# Patient Record
Sex: FEMALE | Race: WHITE | Employment: UNEMPLOYED | URBAN - METROPOLITAN AREA
[De-identification: names, ages, dates, MRNs, and addresses within clinical notes are randomized per-mention and may not be internally consistent; named-entity substitution may affect disease eponyms.]

---

## 2017-01-04 ENCOUNTER — APPOINTMENT (OUTPATIENT)
Dept: CT IMAGING | Facility: CLINIC | Age: 57
End: 2017-01-04
Attending: FAMILY MEDICINE
Payer: MEDICARE

## 2017-01-04 ENCOUNTER — APPOINTMENT (OUTPATIENT)
Dept: CARDIOLOGY | Facility: CLINIC | Age: 57
DRG: 065 | End: 2017-01-04
Attending: INTERNAL MEDICINE
Payer: MEDICARE

## 2017-01-04 ENCOUNTER — APPOINTMENT (OUTPATIENT)
Dept: OCCUPATIONAL THERAPY | Facility: CLINIC | Age: 57
DRG: 065 | End: 2017-01-04
Attending: INTERNAL MEDICINE
Payer: MEDICARE

## 2017-01-04 ENCOUNTER — HOSPITAL ENCOUNTER (EMERGENCY)
Facility: CLINIC | Age: 57
Discharge: ANOTHER HOSPITAL | End: 2017-01-04
Attending: FAMILY MEDICINE | Admitting: FAMILY MEDICINE
Payer: MEDICARE

## 2017-01-04 ENCOUNTER — APPOINTMENT (OUTPATIENT)
Dept: MRI IMAGING | Facility: CLINIC | Age: 57
DRG: 065 | End: 2017-01-04
Attending: INTERNAL MEDICINE
Payer: MEDICARE

## 2017-01-04 ENCOUNTER — TELEPHONE (OUTPATIENT)
Dept: NURSING | Facility: CLINIC | Age: 57
End: 2017-01-04

## 2017-01-04 ENCOUNTER — HOSPITAL ENCOUNTER (INPATIENT)
Facility: CLINIC | Age: 57
LOS: 1 days | Discharge: HOME | DRG: 065 | End: 2017-01-05
Attending: INTERNAL MEDICINE | Admitting: INTERNAL MEDICINE
Payer: MEDICARE

## 2017-01-04 VITALS
DIASTOLIC BLOOD PRESSURE: 84 MMHG | HEIGHT: 65 IN | TEMPERATURE: 97.2 F | SYSTOLIC BLOOD PRESSURE: 145 MMHG | RESPIRATION RATE: 18 BRPM | BODY MASS INDEX: 39.99 KG/M2 | OXYGEN SATURATION: 97 % | WEIGHT: 240 LBS

## 2017-01-04 DIAGNOSIS — I63.40 CEREBRAL INFARCTION DUE TO EMBOLISM OF CEREBRAL ARTERY (H): ICD-10-CM

## 2017-01-04 DIAGNOSIS — I63.9 CEREBROVASCULAR ACCIDENT (CVA), UNSPECIFIED MECHANISM (H): Primary | ICD-10-CM

## 2017-01-04 LAB
ABO + RH BLD: NORMAL
ABO + RH BLD: NORMAL
ALBUMIN SERPL-MCNC: 3.3 G/DL (ref 3.4–5)
ALBUMIN UR-MCNC: NEGATIVE MG/DL
ALP SERPL-CCNC: 146 U/L (ref 40–150)
ALT SERPL W P-5'-P-CCNC: 19 U/L (ref 0–50)
ANION GAP SERPL CALCULATED.3IONS-SCNC: 9 MMOL/L (ref 3–14)
APPEARANCE UR: CLEAR
APTT PPP: 30 SEC (ref 22–37)
APTT PPP: 30 SEC (ref 22–37)
AST SERPL W P-5'-P-CCNC: 12 U/L (ref 0–45)
BACTERIA #/AREA URNS HPF: ABNORMAL /HPF
BASOPHILS # BLD AUTO: 0 10E9/L (ref 0–0.2)
BASOPHILS NFR BLD AUTO: 0.1 %
BILIRUB DIRECT SERPL-MCNC: <0.1 MG/DL (ref 0–0.2)
BILIRUB SERPL-MCNC: 0.5 MG/DL (ref 0.2–1.3)
BILIRUB UR QL STRIP: NEGATIVE
BLD GP AB SCN SERPL QL: NORMAL
BLOOD BANK CMNT PATIENT-IMP: NORMAL
BUN SERPL-MCNC: 10 MG/DL (ref 7–30)
CALCIUM SERPL-MCNC: 7.5 MG/DL (ref 8.5–10.1)
CHLORIDE SERPL-SCNC: 110 MMOL/L (ref 94–109)
CHOLEST SERPL-MCNC: 227 MG/DL
CO2 SERPL-SCNC: 22 MMOL/L (ref 20–32)
COLOR UR AUTO: ABNORMAL
CREAT SERPL-MCNC: 0.92 MG/DL (ref 0.52–1.04)
DIFFERENTIAL METHOD BLD: ABNORMAL
EOSINOPHIL # BLD AUTO: 0.4 10E9/L (ref 0–0.7)
EOSINOPHIL NFR BLD AUTO: 4.3 %
ERYTHROCYTE [DISTWIDTH] IN BLOOD BY AUTOMATED COUNT: 14.4 % (ref 10–15)
GFR SERPL CREATININE-BSD FRML MDRD: 63 ML/MIN/1.7M2
GLUCOSE BLDC GLUCOMTR-MCNC: 105 MG/DL (ref 70–99)
GLUCOSE SERPL-MCNC: 99 MG/DL (ref 70–99)
GLUCOSE UR STRIP-MCNC: NEGATIVE MG/DL
HBA1C MFR BLD: 5.1 % (ref 4.3–6)
HCT VFR BLD AUTO: 35.4 % (ref 35–47)
HDLC SERPL-MCNC: 38 MG/DL
HGB BLD-MCNC: 11.2 G/DL (ref 11.7–15.7)
HGB UR QL STRIP: NEGATIVE
IMM GRANULOCYTES # BLD: 0 10E9/L (ref 0–0.4)
IMM GRANULOCYTES NFR BLD: 0.1 %
INR PPP: 0.95 (ref 0.86–1.14)
INR PPP: 1.14 (ref 0.86–1.14)
KETONES UR STRIP-MCNC: NEGATIVE MG/DL
LDLC SERPL CALC-MCNC: 156 MG/DL
LEUKOCYTE ESTERASE UR QL STRIP: NEGATIVE
LYMPHOCYTES # BLD AUTO: 2.3 10E9/L (ref 0.8–5.3)
LYMPHOCYTES NFR BLD AUTO: 26.7 %
MCH RBC QN AUTO: 31 PG (ref 26.5–33)
MCHC RBC AUTO-ENTMCNC: 31.6 G/DL (ref 31.5–36.5)
MCV RBC AUTO: 98 FL (ref 78–100)
MONOCYTES # BLD AUTO: 0.7 10E9/L (ref 0–1.3)
MONOCYTES NFR BLD AUTO: 7.5 %
MRSA DNA SPEC QL NAA+PROBE: NORMAL
NEUTROPHILS # BLD AUTO: 5.4 10E9/L (ref 1.6–8.3)
NEUTROPHILS NFR BLD AUTO: 61.3 %
NITRATE UR QL: NEGATIVE
NONHDLC SERPL-MCNC: 189 MG/DL
PH UR STRIP: 5.5 PH (ref 5–7)
PLATELET # BLD AUTO: 306 10E9/L (ref 150–450)
POTASSIUM SERPL-SCNC: 3.4 MMOL/L (ref 3.4–5.3)
PROT SERPL-MCNC: 7 G/DL (ref 6.8–8.8)
RADIOLOGIST FLAGS: ABNORMAL
RBC # BLD AUTO: 3.61 10E12/L (ref 3.8–5.2)
RBC #/AREA URNS AUTO: <1 /HPF (ref 0–2)
SODIUM SERPL-SCNC: 141 MMOL/L (ref 133–144)
SP GR UR STRIP: 1.04 (ref 1–1.03)
SPECIMEN EXP DATE BLD: NORMAL
SPECIMEN SOURCE: NORMAL
SQUAMOUS #/AREA URNS AUTO: 1 /HPF (ref 0–1)
TRANS CELLS #/AREA URNS HPF: <1 /HPF (ref 0–1)
TRIGL SERPL-MCNC: 166 MG/DL
TROPONIN I SERPL-MCNC: NORMAL UG/L (ref 0–0.04)
TROPONIN I SERPL-MCNC: NORMAL UG/L (ref 0–0.04)
URN SPEC COLLECT METH UR: ABNORMAL
UROBILINOGEN UR STRIP-MCNC: NORMAL MG/DL (ref 0–2)
WBC # BLD AUTO: 8.8 10E9/L (ref 4–11)
WBC #/AREA URNS AUTO: 1 /HPF (ref 0–2)

## 2017-01-04 PROCEDURE — 93306 TTE W/DOPPLER COMPLETE: CPT

## 2017-01-04 PROCEDURE — 83036 HEMOGLOBIN GLYCOSYLATED A1C: CPT | Performed by: STUDENT IN AN ORGANIZED HEALTH CARE EDUCATION/TRAINING PROGRAM

## 2017-01-04 PROCEDURE — 86901 BLOOD TYPING SEROLOGIC RH(D): CPT | Performed by: STUDENT IN AN ORGANIZED HEALTH CARE EDUCATION/TRAINING PROGRAM

## 2017-01-04 PROCEDURE — 87640 STAPH A DNA AMP PROBE: CPT | Performed by: INTERNAL MEDICINE

## 2017-01-04 PROCEDURE — 70450 CT HEAD/BRAIN W/O DYE: CPT

## 2017-01-04 PROCEDURE — 84484 ASSAY OF TROPONIN QUANT: CPT | Performed by: STUDENT IN AN ORGANIZED HEALTH CARE EDUCATION/TRAINING PROGRAM

## 2017-01-04 PROCEDURE — 84484 ASSAY OF TROPONIN QUANT: CPT | Performed by: FAMILY MEDICINE

## 2017-01-04 PROCEDURE — 81001 URINALYSIS AUTO W/SCOPE: CPT | Performed by: STUDENT IN AN ORGANIZED HEALTH CARE EDUCATION/TRAINING PROGRAM

## 2017-01-04 PROCEDURE — 70498 CT ANGIOGRAPHY NECK: CPT

## 2017-01-04 PROCEDURE — 25000132 ZZH RX MED GY IP 250 OP 250 PS 637: Mod: GY | Performed by: STUDENT IN AN ORGANIZED HEALTH CARE EDUCATION/TRAINING PROGRAM

## 2017-01-04 PROCEDURE — 87641 MR-STAPH DNA AMP PROBE: CPT | Performed by: INTERNAL MEDICINE

## 2017-01-04 PROCEDURE — 97535 SELF CARE MNGMENT TRAINING: CPT | Mod: GO

## 2017-01-04 PROCEDURE — 80061 LIPID PANEL: CPT | Performed by: STUDENT IN AN ORGANIZED HEALTH CARE EDUCATION/TRAINING PROGRAM

## 2017-01-04 PROCEDURE — 80048 BASIC METABOLIC PNL TOTAL CA: CPT | Performed by: FAMILY MEDICINE

## 2017-01-04 PROCEDURE — 85610 PROTHROMBIN TIME: CPT | Performed by: STUDENT IN AN ORGANIZED HEALTH CARE EDUCATION/TRAINING PROGRAM

## 2017-01-04 PROCEDURE — 25000128 H RX IP 250 OP 636: Performed by: STUDENT IN AN ORGANIZED HEALTH CARE EDUCATION/TRAINING PROGRAM

## 2017-01-04 PROCEDURE — 25500045 ZZH RX 255: Performed by: INTERNAL MEDICINE

## 2017-01-04 PROCEDURE — 20000004 ZZH R&B ICU UMMC

## 2017-01-04 PROCEDURE — 99285 EMERGENCY DEPT VISIT HI MDM: CPT | Mod: 25

## 2017-01-04 PROCEDURE — 80076 HEPATIC FUNCTION PANEL: CPT | Performed by: STUDENT IN AN ORGANIZED HEALTH CARE EDUCATION/TRAINING PROGRAM

## 2017-01-04 PROCEDURE — 96365 THER/PROPH/DIAG IV INF INIT: CPT | Mod: 59

## 2017-01-04 PROCEDURE — 36415 COLL VENOUS BLD VENIPUNCTURE: CPT | Performed by: STUDENT IN AN ORGANIZED HEALTH CARE EDUCATION/TRAINING PROGRAM

## 2017-01-04 PROCEDURE — 99406 BEHAV CHNG SMOKING 3-10 MIN: CPT

## 2017-01-04 PROCEDURE — 93005 ELECTROCARDIOGRAM TRACING: CPT

## 2017-01-04 PROCEDURE — 40000141 ZZH STATISTIC PERIPHERAL IV START W/O US GUIDANCE

## 2017-01-04 PROCEDURE — 85610 PROTHROMBIN TIME: CPT | Performed by: FAMILY MEDICINE

## 2017-01-04 PROCEDURE — 96376 TX/PRO/DX INJ SAME DRUG ADON: CPT

## 2017-01-04 PROCEDURE — 25000125 ZZHC RX 250: Performed by: FAMILY MEDICINE

## 2017-01-04 PROCEDURE — 36415 COLL VENOUS BLD VENIPUNCTURE: CPT | Performed by: FAMILY MEDICINE

## 2017-01-04 PROCEDURE — 40000264 ECHO COMPLETE BUBBLE

## 2017-01-04 PROCEDURE — 25000128 H RX IP 250 OP 636: Performed by: FAMILY MEDICINE

## 2017-01-04 PROCEDURE — 85730 THROMBOPLASTIN TIME PARTIAL: CPT | Performed by: STUDENT IN AN ORGANIZED HEALTH CARE EDUCATION/TRAINING PROGRAM

## 2017-01-04 PROCEDURE — 97165 OT EVAL LOW COMPLEX 30 MIN: CPT | Mod: GO

## 2017-01-04 PROCEDURE — 85730 THROMBOPLASTIN TIME PARTIAL: CPT | Performed by: FAMILY MEDICINE

## 2017-01-04 PROCEDURE — 93010 ELECTROCARDIOGRAM REPORT: CPT | Performed by: FAMILY MEDICINE

## 2017-01-04 PROCEDURE — 97110 THERAPEUTIC EXERCISES: CPT | Mod: GO

## 2017-01-04 PROCEDURE — 25500064 ZZH RX 255 OP 636: Performed by: FAMILY MEDICINE

## 2017-01-04 PROCEDURE — 99291 CRITICAL CARE FIRST HOUR: CPT | Mod: 25 | Performed by: FAMILY MEDICINE

## 2017-01-04 PROCEDURE — A9270 NON-COVERED ITEM OR SERVICE: HCPCS | Mod: GY | Performed by: STUDENT IN AN ORGANIZED HEALTH CARE EDUCATION/TRAINING PROGRAM

## 2017-01-04 PROCEDURE — 40000133 ZZH STATISTIC OT WARD VISIT

## 2017-01-04 PROCEDURE — A9585 GADOBUTROL INJECTION: HCPCS | Performed by: INTERNAL MEDICINE

## 2017-01-04 PROCEDURE — 93306 TTE W/DOPPLER COMPLETE: CPT | Mod: 26 | Performed by: INTERNAL MEDICINE

## 2017-01-04 PROCEDURE — 86900 BLOOD TYPING SEROLOGIC ABO: CPT | Performed by: STUDENT IN AN ORGANIZED HEALTH CARE EDUCATION/TRAINING PROGRAM

## 2017-01-04 PROCEDURE — 86850 RBC ANTIBODY SCREEN: CPT | Performed by: STUDENT IN AN ORGANIZED HEALTH CARE EDUCATION/TRAINING PROGRAM

## 2017-01-04 PROCEDURE — 85025 COMPLETE CBC W/AUTO DIFF WBC: CPT | Performed by: FAMILY MEDICINE

## 2017-01-04 PROCEDURE — 00000146 ZZHCL STATISTIC GLUCOSE BY METER IP

## 2017-01-04 PROCEDURE — 70553 MRI BRAIN STEM W/O & W/DYE: CPT

## 2017-01-04 PROCEDURE — 96375 TX/PRO/DX INJ NEW DRUG ADDON: CPT

## 2017-01-04 RX ORDER — FENTANYL CITRATE 50 UG/ML
50-100 INJECTION, SOLUTION INTRAMUSCULAR; INTRAVENOUS
Status: DISCONTINUED | OUTPATIENT
Start: 2017-01-04 | End: 2017-01-04 | Stop reason: HOSPADM

## 2017-01-04 RX ORDER — POTASSIUM CHLORIDE 7.45 MG/ML
10 INJECTION INTRAVENOUS
Status: DISCONTINUED | OUTPATIENT
Start: 2017-01-04 | End: 2017-01-05 | Stop reason: HOSPADM

## 2017-01-04 RX ORDER — SODIUM CHLORIDE 9 MG/ML
INJECTION, SOLUTION INTRAVENOUS CONTINUOUS
Status: DISCONTINUED | OUTPATIENT
Start: 2017-01-04 | End: 2017-01-05

## 2017-01-04 RX ORDER — PANTOPRAZOLE SODIUM 40 MG/1
40 TABLET, DELAYED RELEASE ORAL DAILY
Status: DISCONTINUED | OUTPATIENT
Start: 2017-01-04 | End: 2017-01-04 | Stop reason: ALTCHOICE

## 2017-01-04 RX ORDER — IOPAMIDOL 755 MG/ML
100 INJECTION, SOLUTION INTRAVASCULAR ONCE
Status: COMPLETED | OUTPATIENT
Start: 2017-01-04 | End: 2017-01-04

## 2017-01-04 RX ORDER — NICOTINE 21 MG/24HR
1 PATCH, TRANSDERMAL 24 HOURS TRANSDERMAL DAILY
Status: DISCONTINUED | OUTPATIENT
Start: 2017-01-04 | End: 2017-01-04

## 2017-01-04 RX ORDER — PANTOPRAZOLE SODIUM 40 MG/1
40 TABLET, DELAYED RELEASE ORAL 2 TIMES DAILY
Status: DISCONTINUED | OUTPATIENT
Start: 2017-01-04 | End: 2017-01-05 | Stop reason: HOSPADM

## 2017-01-04 RX ORDER — AMOXICILLIN 250 MG
1-2 CAPSULE ORAL 2 TIMES DAILY
Status: DISCONTINUED | OUTPATIENT
Start: 2017-01-04 | End: 2017-01-05 | Stop reason: HOSPADM

## 2017-01-04 RX ORDER — NICOTINE 21 MG/24HR
1 PATCH, TRANSDERMAL 24 HOURS TRANSDERMAL DAILY
Status: DISCONTINUED | OUTPATIENT
Start: 2017-01-04 | End: 2017-01-04 | Stop reason: DRUGHIGH

## 2017-01-04 RX ORDER — POTASSIUM CHLORIDE 1.5 G/1.58G
20-40 POWDER, FOR SOLUTION ORAL
Status: DISCONTINUED | OUTPATIENT
Start: 2017-01-04 | End: 2017-01-05 | Stop reason: HOSPADM

## 2017-01-04 RX ORDER — ONDANSETRON 4 MG/1
4 TABLET, ORALLY DISINTEGRATING ORAL EVERY 6 HOURS PRN
Status: DISCONTINUED | OUTPATIENT
Start: 2017-01-04 | End: 2017-01-05 | Stop reason: HOSPADM

## 2017-01-04 RX ORDER — GADOBUTROL 604.72 MG/ML
10 INJECTION INTRAVENOUS ONCE
Status: COMPLETED | OUTPATIENT
Start: 2017-01-04 | End: 2017-01-04

## 2017-01-04 RX ORDER — POLYETHYLENE GLYCOL 3350 17 G/17G
17 POWDER, FOR SOLUTION ORAL DAILY
Status: DISCONTINUED | OUTPATIENT
Start: 2017-01-04 | End: 2017-01-05 | Stop reason: HOSPADM

## 2017-01-04 RX ORDER — DIAZEPAM 5 MG
5 TABLET ORAL ONCE
Status: COMPLETED | OUTPATIENT
Start: 2017-01-04 | End: 2017-01-04

## 2017-01-04 RX ORDER — TOPIRAMATE 50 MG/1
200 TABLET, FILM COATED ORAL EVERY MORNING
Status: DISCONTINUED | OUTPATIENT
Start: 2017-01-04 | End: 2017-01-05 | Stop reason: HOSPADM

## 2017-01-04 RX ORDER — TRAZODONE HYDROCHLORIDE 50 MG/1
50 TABLET, FILM COATED ORAL AT BEDTIME
COMMUNITY
Start: 2016-12-27

## 2017-01-04 RX ORDER — LIDOCAINE 40 MG/G
CREAM TOPICAL
Status: DISCONTINUED | OUTPATIENT
Start: 2017-01-04 | End: 2017-01-05 | Stop reason: HOSPADM

## 2017-01-04 RX ORDER — POTASSIUM CHLORIDE 750 MG/1
20-40 TABLET, EXTENDED RELEASE ORAL
Status: DISCONTINUED | OUTPATIENT
Start: 2017-01-04 | End: 2017-01-05 | Stop reason: HOSPADM

## 2017-01-04 RX ORDER — NITROGLYCERIN 0.4 MG/1
TABLET SUBLINGUAL
COMMUNITY
Start: 2016-08-05

## 2017-01-04 RX ORDER — LABETALOL HYDROCHLORIDE 5 MG/ML
10 INJECTION, SOLUTION INTRAVENOUS EVERY 10 MIN PRN
Status: DISCONTINUED | OUTPATIENT
Start: 2017-01-04 | End: 2017-01-05 | Stop reason: HOSPADM

## 2017-01-04 RX ORDER — BISACODYL 10 MG
10 SUPPOSITORY, RECTAL RECTAL DAILY PRN
Status: DISCONTINUED | OUTPATIENT
Start: 2017-01-04 | End: 2017-01-05 | Stop reason: HOSPADM

## 2017-01-04 RX ORDER — ESCITALOPRAM OXALATE 10 MG/1
20 TABLET ORAL DAILY
COMMUNITY

## 2017-01-04 RX ORDER — HYDROCODONE BITARTRATE AND ACETAMINOPHEN 5; 325 MG/1; MG/1
1 TABLET ORAL EVERY 6 HOURS PRN
Status: DISCONTINUED | OUTPATIENT
Start: 2017-01-04 | End: 2017-01-05 | Stop reason: HOSPADM

## 2017-01-04 RX ORDER — MAGNESIUM SULFATE HEPTAHYDRATE 40 MG/ML
4 INJECTION, SOLUTION INTRAVENOUS EVERY 4 HOURS PRN
Status: DISCONTINUED | OUTPATIENT
Start: 2017-01-04 | End: 2017-01-05 | Stop reason: HOSPADM

## 2017-01-04 RX ORDER — DIAZEPAM 5 MG
5 TABLET ORAL EVERY 6 HOURS PRN
COMMUNITY
Start: 2016-12-15

## 2017-01-04 RX ORDER — ACETAMINOPHEN 325 MG/1
650 TABLET ORAL EVERY 4 HOURS PRN
Status: DISCONTINUED | OUTPATIENT
Start: 2017-01-04 | End: 2017-01-05 | Stop reason: HOSPADM

## 2017-01-04 RX ORDER — NALOXONE HYDROCHLORIDE 0.4 MG/ML
.1-.4 INJECTION, SOLUTION INTRAMUSCULAR; INTRAVENOUS; SUBCUTANEOUS
Status: DISCONTINUED | OUTPATIENT
Start: 2017-01-04 | End: 2017-01-05 | Stop reason: HOSPADM

## 2017-01-04 RX ORDER — POTASSIUM CHLORIDE 29.8 MG/ML
20 INJECTION INTRAVENOUS
Status: DISCONTINUED | OUTPATIENT
Start: 2017-01-04 | End: 2017-01-05 | Stop reason: HOSPADM

## 2017-01-04 RX ORDER — SUCRALFATE ORAL 1 G/10ML
1 SUSPENSION ORAL 4 TIMES DAILY
Status: DISCONTINUED | OUTPATIENT
Start: 2017-01-04 | End: 2017-01-05 | Stop reason: HOSPADM

## 2017-01-04 RX ORDER — ATORVASTATIN CALCIUM 40 MG/1
40 TABLET, FILM COATED ORAL DAILY
Status: DISCONTINUED | OUTPATIENT
Start: 2017-01-04 | End: 2017-01-05 | Stop reason: HOSPADM

## 2017-01-04 RX ORDER — DIAZEPAM 5 MG
5 TABLET ORAL EVERY 6 HOURS PRN
Status: DISCONTINUED | OUTPATIENT
Start: 2017-01-04 | End: 2017-01-05 | Stop reason: HOSPADM

## 2017-01-04 RX ORDER — ESCITALOPRAM OXALATE 20 MG/1
20 TABLET ORAL DAILY
Status: DISCONTINUED | OUTPATIENT
Start: 2017-01-04 | End: 2017-01-05 | Stop reason: HOSPADM

## 2017-01-04 RX ORDER — ONDANSETRON 2 MG/ML
4 INJECTION INTRAMUSCULAR; INTRAVENOUS EVERY 6 HOURS PRN
Status: DISCONTINUED | OUTPATIENT
Start: 2017-01-04 | End: 2017-01-05 | Stop reason: HOSPADM

## 2017-01-04 RX ADMIN — NICOTINE 1 PATCH: 7 PATCH, EXTENDED RELEASE TRANSDERMAL at 09:59

## 2017-01-04 RX ADMIN — SODIUM CHLORIDE 70 ML: 9 INJECTION, SOLUTION INTRAVENOUS at 04:23

## 2017-01-04 RX ADMIN — SODIUM CHLORIDE: 0.9 INJECTION, SOLUTION INTRAVENOUS at 10:00

## 2017-01-04 RX ADMIN — HYDROCODONE BITARTRATE AND ACETAMINOPHEN 1 TABLET: 5; 325 TABLET ORAL at 13:48

## 2017-01-04 RX ADMIN — SODIUM CHLORIDE 100 ML: 900 INJECTION INTRAVENOUS at 05:23

## 2017-01-04 RX ADMIN — ATORVASTATIN CALCIUM 40 MG: 40 TABLET, FILM COATED ORAL at 09:59

## 2017-01-04 RX ADMIN — DIAZEPAM 5 MG: 5 TABLET ORAL at 11:08

## 2017-01-04 RX ADMIN — TOPIRAMATE 200 MG: 50 TABLET ORAL at 10:01

## 2017-01-04 RX ADMIN — HYDROCODONE BITARTRATE AND ACETAMINOPHEN 1 TABLET: 5; 325 TABLET ORAL at 20:35

## 2017-01-04 RX ADMIN — GADOBUTROL 10 ML: 604.72 INJECTION INTRAVENOUS at 12:24

## 2017-01-04 RX ADMIN — ALTEPLASE 81.02 MG: KIT at 04:23

## 2017-01-04 RX ADMIN — NICOTINE 1 PATCH: 7 PATCH, EXTENDED RELEASE TRANSDERMAL at 13:49

## 2017-01-04 RX ADMIN — SUCRALFATE 1 G: 1 SUSPENSION ORAL at 10:01

## 2017-01-04 RX ADMIN — SENNOSIDES AND DOCUSATE SODIUM 2 TABLET: 8.6; 5 TABLET ORAL at 09:58

## 2017-01-04 RX ADMIN — PANTOPRAZOLE SODIUM 40 MG: 40 TABLET, DELAYED RELEASE ORAL at 10:17

## 2017-01-04 RX ADMIN — IOPAMIDOL 80 ML: 755 INJECTION, SOLUTION INTRAVENOUS at 04:23

## 2017-01-04 RX ADMIN — SENNOSIDES AND DOCUSATE SODIUM 2 TABLET: 8.6; 5 TABLET ORAL at 20:35

## 2017-01-04 RX ADMIN — PANTOPRAZOLE SODIUM 40 MG: 40 TABLET, DELAYED RELEASE ORAL at 20:35

## 2017-01-04 RX ADMIN — FENTANYL CITRATE 50 MCG: 50 INJECTION, SOLUTION INTRAMUSCULAR; INTRAVENOUS at 04:41

## 2017-01-04 RX ADMIN — SODIUM CHLORIDE 500 ML: 9 INJECTION, SOLUTION INTRAVENOUS at 04:27

## 2017-01-04 RX ADMIN — ESCITALOPRAM OXALATE 20 MG: 20 TABLET ORAL at 09:58

## 2017-01-04 RX ADMIN — TOPIRAMATE 300 MG: 50 TABLET ORAL at 20:36

## 2017-01-04 RX ADMIN — ALTEPLASE 9 MG: KIT at 04:21

## 2017-01-04 ASSESSMENT — VISUAL ACUITY
OU: NORMAL ACUITY

## 2017-01-04 ASSESSMENT — ACTIVITIES OF DAILY LIVING (ADL): IADL_COMMENTS: ASSIST FROM FAMILY

## 2017-01-04 ASSESSMENT — ENCOUNTER SYMPTOMS
SHORTNESS OF BREATH: 0
CONFUSION: 0
ABDOMINAL PAIN: 0
FEVER: 0
NAUSEA: 0
WEAKNESS: 1
SPEECH DIFFICULTY: 0
LIGHT-HEADEDNESS: 0
DIFFICULTY URINATING: 0
FACIAL ASYMMETRY: 0
HEADACHES: 1
DIZZINESS: 0
COUGH: 0
NUMBNESS: 0

## 2017-01-04 NOTE — PLAN OF CARE
Problem: Goal Outcome Summary  Goal: Goal Outcome Summary  SLP: Orders received for bedside swallow evaluation.  Swallow screened by MD team resulting in the recommendation of a regular texture diet and thin liquids.  RN reports no concerns re: swallow function.  Upon discussion with MD team, swallow evaluation to be deferred.  ST to sign off; please re-consult as medically appropriate.

## 2017-01-04 NOTE — PLAN OF CARE
Problem: Goal Outcome Summary  Goal: Goal Outcome Summary  OT 4A: OT eval and treatment completed. Tolerated range of motion supine in bed and provided with adaptive equipment for increased independence with self feeding. Limited participation due to bedrest.     Anticipate home with assist for heavy household chores with OP OT/PT to increase strength and activity tolerance for ADLs and R hand deficits, but will await until able to assess out of bed activity

## 2017-01-04 NOTE — PLAN OF CARE
Problem: Goal Outcome Summary  Goal: Goal Outcome Summary  Outcome: No Change      Patient arrived via ambulance from [facility] after receiving TPA for suspected stroke.  Symptoms only involved inability of right hand to move, sensation still present.  TPA received at 0421.  Upon arrival, patient able to move right thumb.  No other deficits noted except baseline RLE weakness and touch sensitivity to right ankle due to neuropathies.  Patient arrived with PIV x 1.  Patient passed swallow evaluation by MD.  See flowsheet for specifics.  Continue to monitor.

## 2017-01-04 NOTE — TELEPHONE ENCOUNTER
"Call Type: Triage Call    Presenting Problem: Verena states that about an hour ago, her right  hand \"quit working\". She is able to lift her right arm without  difficulty. She is not able to  with her right hand, or even  move one finger. Denies headache, trouble speaking, one sided facial  droop, lightheaded or dizziness. No known injury.  Triage Note:  Guideline Title: Neurological Deficits  Recommended Disposition: Activate   Original Inclination: Wanted to speak with a nurse  Override Disposition:  Intended Action: Go to Hospital / ED  Physician Contacted: No  New numbness, weakness or paralysis involving face, arm or leg, especially on same  side of body, loss of balance or coordination, confusion or trouble speaking  occurring now or within last 8 hours ?  YES  New or worsening signs and symptoms that may indicate shock ? NO  Seizure hasn't stopped (continuous) OR does not fully awaken between seizures ? NO  Unconscious now or within last 6 hours ? NO  Sudden, severe disabling head pain OR caller spontaneously verbalizes \"worst  headache of my life\" ? NO  New paralysis (unable to move) or weakness (not due to pain) involving both sides  of body below a specific level ? NO  First seizure or probable first seizure AND remains not fully alert, confused,  disoriented, or combative 5 minutes or more after seizure has stopped. ? NO  Physician Instructions:  Care Advice:  "

## 2017-01-04 NOTE — H&P
[facility], [place]      Neurology Stroke Admission    Patient Name: Verena Velasquez  : 1960 MRN#: [id number]    STROKE DATA     Stroke Code:  Stroke code not indicated. Patient presented from outside hospital after receiving tPA.  Time patient seen:  2017 0720   Last known normal (pt's baseline):  1/3/17 0130     TPA treatment:  tPA given at outside hospital at 0421 17    National Institutes [employer] Stroke Scale (at presentation)  NIHSS done at:  0720 17 on arrival to [facility]      Score   Level of consciousness: (0)   Alert, keenly responsive   LOC questions: (0)   Answers both questions correctly   LOC commands: (0)   Performs both tasks correctly   Best gaze: (0)   Normal   Visual: (0)   No visual loss   Facial palsy: (0)   Normal symmetrical movements   Motor arm (left): (0)   No drift   Motor arm (right): (0)   No drift   Motor leg (left): (0)   No drift   Motor leg (right): (0)   No drift   Limb ataxia: (1)   Present in one limb   Sensory: (0)   Normal- no sensory loss   Best language: (0)   Normal- no aphasia   Dysarthria: (0)   Normal   Extinction and inattention: (0)   No abnormality       NIHSS Total Score: 1        Dysphagia Screen  Time of screenin2017 0730  Screening results: Passed screening, no dysarthria - Regular Diet with thin liquids     ASSESSMENT & PLAN BY PROBLEM     Work-up for Ischemic Stroke (post TPA)      Acute Ischemic Stroke: presented from outside hospital with 2 hours of right hand weakness and dysmetria s/p tPA.  - Risks and benefits of tPA discussed with outside hospital provider prior to administration  - Admit to Neuro ICU, under Neurocritical Care Team  - Close monitoring and neurochecks for any evidence of hemorrhagic transformation or allergic reaction  - Labetalol PRN to maintain BP < 180/105  - Euthermia: prn acetaminophen   - Euglycemia: goal   - Head of bed elevated  - Hold aspirin and pharmacologic DVT  prophylaxis for at least 24 hrs post-tPA  - Started atorvastatin 40mg, lipid panel pending  - Will not repeat HCT 24 hrs post-tPA as MRI will be done today.  - MRI Stroke Protocol (Received CTA at outside hospital, no need for MRA)  - TTE with Bubble Study  - Telemetry, EKG  - Bedside Glucose Monitoring  - A1c, Lipid Panel, Troponin x 2 (troponin checked 1 time at outside hospital)  - PT/OT/SLP  - PM&R  - Stroke Education  - Smoking Cessation education; nicotine patch 7mg and nicotine gum prn  - Depression Screen  - Apnea Screen     Patient was admitted via transfer from [facility] ED.     The patient will be admitted to the Neuro Critical Care/Stroke team..     Neuro: Acute stroke: plan as above.  History of Complex Regional Pain Syndrome: Will continue PTA  topiramate.      Cardiac: Patient has history HTN per chart review. No antihypertensives PTA.  Monitoring BP closely post tPA as above.    Respiratory: History of COPD per chart review.  No history of PFTs . Prescribed home inhalers but she stated she has not used them in over a year. No acute concerns at this time.    GI: History of GERD.  Will continue PTA nexium and sucralfate.    ID: No acute concerns at this time.    Endocrine: No history of Diabetes.  HbA1c 5.1 on admission. Glucose checks and euglycemic as above.     Renal: No history of kidney disease. No acute concerns at this time.     Hematology: No acute concerns at this time.     Psych: History of anxiety. Will continue PTA lexapro and prn valium.    Fluids/Electrolytes/Nutrition  0.9% sodium chloride @ 100 mL/hr  Avoid hypotonic fluids.    Nutrition:   Active Diet Order  Regular Diet Adult    Prophylaxis            SCDs. Avoid heparin and lovenox in the first 24 hours post TPA    GI: continue PTA nexium    Code Status  Full Code    HPI  Verena Velasquez is a 56 year old right handed female with a history of HTN, chronic regional pain syndrome, tobacco use, and anxiety who is presenting from an  outside hospital for right hand weakness.  She was watching TV at home and noted difficultly grasping the remote control with her right hand at 0130 on 1/4/16, which is the last known normal.  She then presented to an outside hospital at 0330 on 1/4/16 and was noted to have an NIHSS of 2 for right hand weakness and RUE dysmetria.  CT head showed evidence of chronic small vessel disease and CTA was unremarkable.  TPA was administered at 0421 on 1/4/16. She was then transferred to [facility]. In the ambulance ride to [facility] she noted she was able to move her right thumb but still had difficulty moving her right wrist and fingers. On arrival her NIHSS was noted to be 1 for RUE dysmetria. She denied any changes in vision, changes in speech, difficulty swallowing, numbness, tingling, and weakness with the exception of her right hand. She denied any prior history of stroke.     Pertinent Past Medical/Surgical History  Past Medical History   Diagnosis Date     Hypertension      Gastro-oesophageal reflux disease      COPD (chronic obstructive pulmonary disease) (H)      Chronic pain        Past Surgical History   Procedure Laterality Date     Orthopedic surgery       Gyn surgery         Medications: I have reviewed this patient's current medications.    Allergies: All allergies reviewed and addressed.    Family History: This patient has no significant family history.    Social History: I have reviewed this patient's social history.    Tobacco use: 1ppd for 30 years, now 3 cigarettes per day for the last 3 months    ROS:  The 10 point Review of Systems is negative other than noted in the HPI or here.     PHYSICAL EXAMINATION  Vital Signs:  B/P: 136/88,  T: 97.6,  P: Data Unavailable,  R: 17    General:  Lying in bed and in NAD.    HEENT:  AT/NC. MMM  Cardio:  RRR, no murmurs appreciated.   Pulmonary:  Clear to auscultation bilaterally, no wheezes or crackles noted. No increased work of breathing  Abdomen:  Bowel sounds  present  Extremities:  No pedal edema.  Skin:  No rashes or lesions    Neurologic  Mental Status:  Alert, oriented to person, place, and time.  Interacting appropriately and able to follow commands.  No evidence of aphasia.  Cranial Nerves:  PERRL, visual fields intact, EOMI with normal smooth pursuit, facial sensation intact and symmetric, facial movements symmetric, hearing not formally tested but intact to conversation, palate elevation symmetric and uvula midline, no dysarthria, shoulder shrug strong bilaterally, tongue protrusion midline  Motor:  No abnormal movements, normal tone throughout, normal muscle bulk, no pronator drift.  Strength 5/5 throughout LUE, LLE, and RLE.  Strength 5/5 with right shoulder abduction, arm flexion/extension. Unable to move right wrist against gravity with wrist flexion/extension.  Unable to adduct/abduct left fingers. Normal ROM with right thumb.    Reflexes:  2+ and symmetric throughout, toes mute  Sensory:  intact/symmetric to light touch throughout upper and lower extremities. No evidence of neglect.  Coordination:  FNF intact in LUE and HS intact without dysmetria. FNF in RUE with mild dysmetria.  Station/Gait:  Deferred; bedrest for 1st 24 hours post TPA    Labs  Labs and Imaging reviewed and used in developing the plan; pertinent results included.     Lab Results   Component Value Date    GLC 99 01/04/2017       The patient was discussed with the staff Neurologist, Dr. Álvarez.    Meek Bernard   Neurology PGY1  Pager: [phone]

## 2017-01-04 NOTE — IP AVS SNAPSHOT
MRN:[id number]                      After Visit Summary   1/4/2017    Verena Velasquez    MRN: [id number]           Thank you!     Thank you for choosing [facility] for your care. Our goal is always to provide you with excellent care. Hearing back from our patients is one way we can continue to improve our services. Please take a few minutes to complete the written survey that you may receive in the mail after you visit with us. Thank you!        Patient Information     Date Of Birth          1960        About your hospital stay     You were admitted on:  January 4, 2017 You last received care in the:  [address] [facility] [place]    You were discharged on:  January 5, 2017        Reason for your hospital stay       Right hand weakness found to have a stroke.                  Who to Call     For medical emergencies, please call 911.  For non-urgent questions about your medical care, please call your primary care provider or clinic, [phone]          Attending Provider     Provider    Cali Álvarez MD       Primary Care Provider Office Phone # Fax #    Erlin B Jermeyi [phone]       [facility] [address] [facility] [address]         When to contact your care team       Please go to the emergency room if you are having any vision changes, changes in speech, facial droop, difficulty swallowing, weakness, or numbness as these can be symptoms of stroke.                  After Care Instructions     Activity       Your activity upon discharge: activity as tolerated            Diet       Follow this diet upon discharge: Orders Placed This Encounter  Regular Diet Adult            Discharge Instructions       1) Follow up with PCP within 7 days for hospital follow up  2) Follow up in stroke clinic. Please Clinic as noted above to schedule appointment.  3) Zio Patch cardiac monitor for two weeks.  Results will be discussed at stroke follow up.  4) Start aspirin 325mg,  atorvastatin 40mg, and nicotine gum as needed  5) Follow up with Occupational Therapy  5)                  Follow-up Appointments     Adult [facility] Follow-up and recommended labs and tests       Follow up with primary care provider, Erlin Webster, within 7 days for hospital follow- up.      Please contact Stroke Clinic- Neurology Clinic at [phone], pick option #1,  if you have not been contacted to schedule a stroke follow up appointment in 5 days of discharge. At stroke clinic results of Zio Patch will be discussed.    If you have other stroke related questions, please call [phone], pick option #3, and ask to speak to Edwar Urias RN Stroke/Endovascular Care Coordinator.    If you have any urgent stroke related questions after hours, please contact the hospital  at [phone] and ask to be connected to a stroke provider.     Appointments on [place] and/or [facility] (with [facility] or [facility] provider or service). Call [phone] if you haven't heard regarding these appointments within 7 days of discharge.                  Additional Services     Occupational Therapy Referral       This therapy referral will be filtered to a centralized scheduling office at [facility] and the patient will receive a call to schedule an appointment at a [facility] location most convenient for them.     [facility] provides Occupational Therapy evaluation and treatment and many specialty services across the [facility] system.  If requesting a specialty program, please choose from the list below.    If you have not heard from the scheduling office within 2 business days, please call [phone] for all locations, with the exception of [place], please call [phone].     Treatment: Evaluation & Treatment  Special Instructions/Modalities: Right hand weakness after stroke    Please be aware that coverage of these services is subject to the terms and limitations of your  health insurance plan.  Call member services at your health plan with any benefit or coverage questions.      Note to Provider:  If you are referring outside of [place] for the therapy appointment, please list the name of the location in the  special instructions  above, print the referral and give to the patient to schedule the appointment.                  Stroke Education     Please review the items below to help with stroke prevention.  Additional prevention suggestions are in the Understanding Stroke Handbook that you received.    Stroke risk factor changes that can help with stroke prevention:      Blood Pressure: Maintain your blood pressure within the goal the doctor sets with you.    If you are diabetic, work with your doctor to control your blood sugar and monitor your hemoglobin A1C (HbA1c).     Your doctor may recommend a statin medication to help lower your cholesterol level.    If you have an irregular heartbeat, speak to your doctor about possible treatments.    Maintain a healthy weight.    Eat a healthy diet. We suggest a Mediterranean or heart-healthy diet, but discuss what's best for you with your doctor.    Limit alcohol consumption.    If you smoke - QUIT.  We encourage you to get support. Start by talking with your doctor. Another option is to call the Quit Plan Program at [phone].    Stroke  Warning Signs:     It s important to know the warning signs of stroke. Call 911 if you experience one or more warning signs like these:      Sudden numbness or weakness of the face, arm or leg, especially on one side of the body    Sudden confusion, trouble speaking or understanding    Sudden trouble seeing in one or both eyes    Sudden trouble walking, dizziness, loss of balance or coordination    Sudden severe headache with no known cause          Pending Results     No orders found for last 2 day(s).            Statement of Approval     Ordered          01/05/17 1121  I have reviewed and agree  with all the recommendations and orders detailed in this document.   EFFECTIVE NOW     Approved and electronically signed by:  Meek Bernard MD             Admission Information        Provider Department Dept Phone    1/4/2017 [facility] Marce Álvarez MD  [facility] [phone]      Your Vitals Were     Blood Pressure Temperature Respirations Weight Pulse Oximetry       118/57 mmHg 98  F (36.7  C) (Oral) 19 108.1 kg (238 lb 5.1 oz) 97%       Care EveryWhere ID     This is your Care EveryWhere ID. This could be used by other organizations to access your [facility] medical records  [id number]           Review of your medicines      START taking        Dose / Directions    aspirin 325 MG EC tablet   Used for:  Cerebrovascular accident (CVA), unspecified mechanism (H)        Dose:  325 mg   Take 1 tablet (325 mg) by mouth daily   Quantity:  60 tablet   Refills:  1       atorvastatin 40 MG tablet   Commonly known as:  LIPITOR   Used for:  Cerebrovascular accident (CVA), unspecified mechanism (H)        Dose:  40 mg   Take 1 tablet (40 mg) by mouth daily   Quantity:  30 tablet   Refills:  3       nicotine polacrilex 2 MG gum   Commonly known as:  NICORETTE   Used for:  Cerebrovascular accident (CVA), unspecified mechanism (H)        Dose:  2 mg   Place 1 each (2 mg) inside cheek every hour as needed for smoking cessation   Quantity:  20 each   Refills:  0         CONTINUE these medicines which have NOT CHANGED        Dose / Directions    albuterol (2.5 MG/3ML) 0.083% neb solution   Used for:  Obstructive chronic bronchitis with exacerbation (H)        Dose:  1 vial   Take 1 vial (2.5 mg) by nebulization every 4 hours as needed for shortness of breath / dyspnea (wheezing/cough)   Quantity:  1 Box   Refills:  0       BENADRYL PO        Dose:  75 mg   Take 75 mg by mouth   Refills:  0       diazepam 5 MG tablet   Commonly known as:  VALIUM        Dose:  5 mg   Take 5 mg by mouth every 6 hours as needed    Refills:  0       escitalopram 10 MG tablet   Commonly known as:  LEXAPRO        Dose:  20 mg   Take 20 mg by mouth daily   Refills:  0       fentaNYL 25 mcg/hr 72 hr patch   Commonly known as:  DURAGESIC        Dose:  1 patch   Place 1 patch onto the skin every 72 hours   Refills:  0       FLEXERIL PO        Dose:  5 mg   Take 5 mg by mouth 3 times daily Pt reports she takes 3 tablets at bedtime   Refills:  0       fluticasone 110 MCG/ACT Inhaler   Commonly known as:  FLOVENT HFA        Dose:  2 puff   Inhale 2 puffs into the lungs 2 times daily   Refills:  0       HYDROcodone-acetaminophen 5-325 MG per tablet   Commonly known as:  NORCO        Dose:  1 tablet   Take 1 tablet by mouth every 6 hours as needed for moderate to severe pain   Refills:  0       ipratropium - albuterol 0.5 mg/2.5 mg/3 mL 0.5-2.5 (3) MG/3ML neb solution   Commonly known as:  DUONEB        Dose:  1 vial   Inhale 1 vial (3 mLs) into the lungs every 6 hours as needed for shortness of breath / dyspnea or wheezing   Quantity:  150 mL   Refills:  0       NEXIUM PO        Dose:  40 mg   Take 40 mg by mouth 2 times daily   Refills:  0       NITROSTAT 0.4 MG sublingual tablet   Generic drug:  nitroglycerin        Refills:  0       sucralfate 1 GM/10ML suspension   Commonly known as:  CARAFATE        Dose:  1 g   Take 10 mLs (1 g) by mouth 4 times daily   Quantity:  420 mL   Refills:  1       * TOPIRAMATE PO        Dose:  300 mg   Take 300 mg by mouth At Bedtime   Refills:  0       * TOPIRAMATE PO        Dose:  200 mg   Take 200 mg by mouth every morning   Refills:  0       traZODone 50 MG tablet   Commonly known as:  DESYREL        Dose:  125 mg   Take 125 mg by mouth At Bedtime   Refills:  0       * Notice:  This list has 2 medication(s) that are the same as other medications prescribed for you. Read the directions carefully, and ask your doctor or other care provider to review them with you.         Where to get your medicines      These  medications were sent to [id number] - [place], [place] - [address]  [address], [facility] [address]     Phone:  [phone]    - aspirin 325 MG EC tablet  - atorvastatin 40 MG tablet  - nicotine polacrilex 2 MG gum             Protect others around you: Learn how to safely use, store and throw away your medicines at www.disposemymeds.org.             Medication List: This is a list of all your medications and when to take them. Check marks below indicate your daily home schedule. Keep this list as a reference.      Medications           Morning Afternoon Evening Bedtime As Needed    albuterol (2.5 MG/3ML) 0.083% neb solution   Take 1 vial (2.5 mg) by nebulization every 4 hours as needed for shortness of breath / dyspnea (wheezing/cough)                                aspirin 325 MG EC tablet   Take 1 tablet (325 mg) by mouth daily                                   atorvastatin 40 MG tablet   Commonly known as:  LIPITOR   Take 1 tablet (40 mg) by mouth daily   Last time this was given:  40 mg on 1/5/2017  7:59 AM                                   BENADRYL PO   Take 75 mg by mouth                                diazepam 5 MG tablet   Commonly known as:  VALIUM   Take 5 mg by mouth every 6 hours as needed   Last time this was given:  10 mg on 1/4/2017 11:08 AM                                escitalopram 10 MG tablet   Commonly known as:  LEXAPRO   Take 20 mg by mouth daily   Last time this was given:  20 mg on 1/5/2017  7:59 AM                                fentaNYL 25 mcg/hr 72 hr patch   Commonly known as:  DURAGESIC   Place 1 patch onto the skin every 72 hours                                FLEXERIL PO   Take 5 mg by mouth 3 times daily Pt reports she takes 3 tablets at bedtime                                fluticasone 110 MCG/ACT Inhaler   Commonly known as:  FLOVENT HFA   Inhale 2 puffs into the lungs 2 times daily                                HYDROcodone-acetaminophen 5-325 MG per tablet    Commonly known as:  NORCO   Take 1 tablet by mouth every 6 hours as needed for moderate to severe pain   Last time this was given:  1 tablet on 1/5/2017  2:07 AM                                ipratropium - albuterol 0.5 mg/2.5 mg/3 mL 0.5-2.5 (3) MG/3ML neb solution   Commonly known as:  DUONEB   Inhale 1 vial (3 mLs) into the lungs every 6 hours as needed for shortness of breath / dyspnea or wheezing                                NEXIUM PO   Take 40 mg by mouth 2 times daily                                nicotine polacrilex 2 MG gum   Commonly known as:  NICORETTE   Place 1 each (2 mg) inside cheek every hour as needed for smoking cessation                                NITROSTAT 0.4 MG sublingual tablet   Generic drug:  nitroglycerin                                sucralfate 1 GM/10ML suspension   Commonly known as:  CARAFATE   Take 10 mLs (1 g) by mouth 4 times daily   Last time this was given:  1 g on 1/4/2017 10:01 AM                                * TOPIRAMATE PO   Take 300 mg by mouth At Bedtime   Last time this was given:  200 mg on 1/5/2017  7:58 AM                                * TOPIRAMATE PO   Take 200 mg by mouth every morning   Last time this was given:  200 mg on 1/5/2017  7:58 AM                                traZODone 50 MG tablet   Commonly known as:  DESYREL   Take 125 mg by mouth At Bedtime                                * Notice:  This list has 2 medication(s) that are the same as other medications prescribed for you. Read the directions carefully, and ask your doctor or other care provider to review them with you.              More Information        Atorvastatin Calcium Oral tablet  What is this medicine?  ATORVASTATIN (a TORE va sta tin) is known as a HMG-CoA reductase inhibitor or 'statin'. It lowers the level of cholesterol and triglycerides in the blood. This drug may also reduce the risk of heart attack, stroke, or other health problems in patients with risk factors for heart  disease. Diet and lifestyle changes are often used with this drug.  This medicine may be used for other purposes; ask your health care provider or pharmacist if you have questions.  What should I tell my health care provider before I take this medicine?  They need to know if you have any of these conditions:    frequently drink alcoholic beverages    history of stroke, TIA    kidney disease    liver disease    muscle aches or weakness    other medical condition    an unusual or allergic reaction to atorvastatin, other medicines, foods, dyes, or preservatives    pregnant or trying to get pregnant    breast-feeding  How should I use this medicine?  Take this medicine by mouth with a glass of water. Follow the directions on the prescription label. You can take this medicine with or without food. Take your doses at regular intervals. Do not take your medicine more often than directed.  Talk to your pediatrician regarding the use of this medicine in children. While this drug may be prescribed for children as young as 10 years old for selected conditions, precautions do apply.  Overdosage: If you think you have taken too much of this medicine contact a poison control center or emergency room at once.  NOTE: This medicine is only for you. Do not share this medicine with others.  What if I miss a dose?  If you miss a dose, take it as soon as you can. If it is almost time for your next dose, take only that dose. Do not take double or extra doses.  What may interact with this medicine?  Do not take this medicine with any of the following medications:    red yeast rice    telaprevir    telithromycin    voriconazole  This medicine may also interact with the following medications:    alcohol    antiviral medicines for HIV or AIDS    boceprevir    certain antibiotics like clarithromycin, erythromycin, troleandomycin    certain medicines for cholesterol like fenofibrate or  gemfibrozil    cimetidine    clarithromycin    colchicine    cyclosporine    digoxin    female hormones, like estrogens or progestins and birth control pills    grapefruit juice    medicines for fungal infections like fluconazole, itraconazole, ketoconazole    niacin    rifampin    spironolactone  This list may not describe all possible interactions. Give your health care provider a list of all the medicines, herbs, non-prescription drugs, or dietary supplements you use. Also tell them if you smoke, drink alcohol, or use illegal drugs. Some items may interact with your medicine.  What should I watch for while using this medicine?  Visit your doctor or health care professional for regular check-ups. You may need regular tests to make sure your liver is working properly.  Tell your doctor or health care professional right away if you get any unexplained muscle pain, tenderness, or weakness, especially if you also have a fever and tiredness. Your doctor or health care professional may tell you to stop taking this medicine if you develop muscle problems. If your muscle problems do not go away after stopping this medicine, contact your health care professional.  This drug is only part of a total heart-health program. Your doctor or a dietician can suggest a low-cholesterol and low-fat diet to help. Avoid alcohol and smoking, and keep a proper exercise schedule.  Do not use this drug if you are pregnant or breast-feeding. Serious side effects to an unborn child or to an infant are possible. Talk to your doctor or pharmacist for more information.  This medicine may affect blood sugar levels. If you have diabetes, check with your doctor or health care professional before you change your diet or the dose of your diabetic medicine.  If you are going to have surgery tell your health care professional that you are taking this drug.  What side effects may I notice from receiving this medicine?  Side effects that you should  report to your doctor or health care professional as soon as possible:    allergic reactions like skin rash, itching or hives, swelling of the face, lips, or tongue    dark urine    fever    joint pain    muscle cramps, pain    redness, blistering, peeling or loosening of the skin, including inside the mouth    trouble passing urine or change in the amount of urine    unusually weak or tired    yellowing of eyes or skin  Side effects that usually do not require medical attention (report to your doctor or health care professional if they continue or are bothersome):    constipation    heartburn    stomach gas, pain, upset  This list may not describe all possible side effects. Call your doctor for medical advice about side effects. You may report side effects to FDA at 8-129-DEX-1903.  Where should I keep my medicine?  Keep out of the reach of children.  Store at room temperature between 20 to 25 degrees C (68 to 77 degrees F). Throw away any unused medicine after the expiration date.  NOTE:This sheet is a summary. It may not cover all possible information. If you have questions about this medicine, talk to your doctor, pharmacist, or health care provider. Copyright  2016 [employer] Standard                Stroke and Heart Disease  Every part of your body, including your heart and your brain, needs oxygen to work. Oxygen is carried in the blood. Blood vessels called arteries carry oxygen-rich blood throughout the body. Both heart attack and stroke are due to problems in the arteries. The same factors that cause heart disease can make you more likely to have a stroke.    Heart attack. A heart attack is caused by blockage in an artery that carries blood to the heart muscle. If blood is blocked, that part of the heart muscle is damaged or dies.    Stroke. If an artery supplying the brain is blocked, a stroke may result. This is called an ischemic stroke. It is caused by a piece of plaque breaking loose from an artery (such as  a carotid artery in the neck) or from the heart and lodging in the brain. A stroke caused by the rupture of a weakened blood vessel is called a hemorrhagic stroke.  Both heart attack and stroke are medical emergencies that can lead to serious health problems. They can even be fatal.      Healthy artery  A healthy artery is a tube with flexible walls and a smooth inner lining. Blood flows freely through it.  Unhealthy artery  Artery problems start when the inner lining gets damaged. This is often due to risk factors such as smoking and high blood pressure. These can make the artery walls stiff. Plaque, a fatty mix of cholesterol and other material, forms in the lining. This narrows the channel. Plaque can break, restricting blood flow further. It can also cause a blood clot to form. A blood clot may block the artery s channel completely.   Reducing your risk  Making changes that make your arteries healthier will help lower your risk for both heart attack and stroke. If you have heart disease, you may need to work on a few aspects of your lifestyle. But remember that the things that are good for your arteries, heart, and brain are also good for the rest of your body.  Your health care provider will work with you to modify lifestyle factors as needed to help prevent progression of atherosclerotic cardiovascular disease. This can lead to heart attack or stroke. Factors you may need to work on include:    Diet. Your health care provider will give you information on dietary changes that you may need to make based on your situation. Your provider may recommend that you see a registered dietitian for help with diet changes. Changes may include:    Reducing fat and cholesterol intake    Reducing sodium (salt) intake, especially if you have high blood pressure    Increasing your intake of fresh vegetables and fruits    Eating lean proteins, such as fish, poultry, and legumes (beans and peas) and eating less red meat and  processed meats    Using low- or no-fat diary products    Using vegetable and nut oils in limited amounts    Limiting sweets and processed foods such as chips, cookies, and baked goods    Physical activity. Your health care provider may recommend that you increase your physical activity if you have not been as active as possible. Depending on your situation, your provider may advise you to include moderate to vigorous intensity activity for at least 40 minutes each day for at least 3 to 4 days per week. Examples of moderate to vigorous activity include:    Walking at a brisk pace, about 3 to 4 miles per hour    Jogging or running    Swimming or water aerobics    Hiking    Dancing    Martial arts    Tennis    Riding a bike or a stationary bike    Weight management. If you are overweight or obese, your health care provider will work with you to lose weight and lower your BMI (body mass image) to a normal or near-normal level. Making dietary changes and increasing physical activity can help.    Smoking. If you smoke, break the smoking habit. Enroll in a stop-smoking program to improve your chances of success.    Stress. Learn stress management techniques to help you deal with stress in your home and work life.    4172-2098 [employer]. [address] [place] [address]. All rights reserved. This information is not intended as a substitute for professional medical care. Always follow your healthcare professional's instructions.                Stroke--Self-Care  Performing your routine tasks may be difficult after you ve had a stroke (brain attack). But many people can learn ways to manage their daily activities. In fact, daily activities may help you to regain muscle strength and bring back function to affected limbs.  Bathing and dressing  By learning a few new ways of doing things, most people who have had a stroke can bathe and dress themselves. You may want to try the following:    Test water  "temperature with a hand or foot that was not affected by the stroke.    Use grab bars, a shower seat, a hand-held shower, and a long-handled brush.    Dress while sitting, starting with the affected side or limb.    Put on shirts that pull over the head, and pants or skirts with elastic waistbands.    Use zippers with loops attached to them.    Visit the hair salon weekly, or change to a \"wash and wear\" hairstyle to avoid using blow dryers and curling irons.    Use electric erika instead of razors to avoid injuries.    Review grooming with your occupational therapist.  Managing bladder and bowel problems  After your stroke, you may not be able to control your bladder and bowels. Nurses will work closely with you to set up a new routine.    You may be taken to the toilet on a schedule -- perhaps every 2 hours to 3 hours. Making a bathroom stop before going out may also work well.    A time may be set to empty the bowel. This will help train your bladder and bowels to go at specific intervals.    Absorbent briefs or a condom catheter (a small bag that fits over the penis) may be used.    You can use adult diapers if you need to.    Drink fluids (especially caffeine and alcohol) in the daytime and limit them in the evening to avoid having to use the bathroom at night.    6140-6925 The [employer]. [address] [place] [address]. All rights reserved. This information is not intended as a substitute for professional medical care. Always follow your healthcare professional's instructions.                Stroke: Resources and Support  After being released from rehab, your loved one may need ongoing therapy or nursing care. Talk with a  or  about planning for care and local sources of support.  Planning for home care     In some cases, health care providers can make home visits.      A nurse may come and check his or her blood pressure.    A physical therapist may help with " exercises. The therapist will often show him or her and family members certain exercises that can be done without supervision.    Speech and occupational therapists can help the whole family communicate and handle tasks of daily living better.  Adult day care  You may be afraid to leave your loved one alone. Adult day care facilities can provide supervision if you need time away during the day. They also give your loved one a chance to be with other people.  Other resources  You can also check your phone book and the internet for other resources. Try the following listings:    [employer]    Recreation centers    Adult         Support groups    Online stroke support communities    National Stroke Association  210.486.4683     American Stroke Association  [phone]     [facility] [place]  [phone]     6952-9268 [employer]. [address] [place] [address]. All rights reserved. This information is not intended as a substitute for professional medical care. Always follow your healthcare professional's instructions.                What Is Ischemic Stroke?  The brain needs a constant supply of blood to work. During a stroke, blood stops flowing to part of the brain. The affected area is damaged. Its functions are harmed or even lost. Most strokes are caused by a blockage in a blood vessel that supplies the brain. They can also occur if a blood vessel in the brain ruptures (bursts open).     The carotid arteries carry blood from the heart to the brain.   From the heart to the brain  The heart is a pump. It sends oxygen-rich blood out through blood vessels called arteries. If an artery between the heart and the brain is blocked, the brain can t get enough oxygen. Some artery blockages are caused by fatty deposits (plaque). Arteries can also be blocked by blood clots. Some clots form on the plaque. Others can form in the heart -- especially in people  with atrial fibrillation, an irregular heart rhythm. If a piece of plaque or clot breaks off and enters the bloodstream, it can flow to the brain and cause a stroke.  How a stroke occurs  Ischemic stroke occurs when an artery that supplies the brain is greatly narrowed or blocked. This can be caused by a buildup of plaque. It can also occur when small pieces of plaque or blood clot (called emboli) break off from the blood vessel or heart into the bloodstream. The emboli flow in the blood until they get stuck in a small blood vessel in the brain.  Healthy Arteries   Damaged Arteries     Healthy arteries. In a healthy artery, the lining of the artery wall is smooth. This lets blood flow freely from the heart to the rest of the body. The brain gets all the blood it needs to function well.  Damaged arteries. High blood pressure, cigarette smoking, or other problems can roughen artery walls. This allows plaque to build up in the walls. Blood clots may also form on the plaque. This can narrow the artery and limit blood flow.     2916-4553 The [employer]. [address] [place] [address]. All rights reserved. This information is not intended as a substitute for professional medical care. Always follow your healthcare professional's instructions.                Symptoms of a Stroke  During a stroke, blood stops flowing to part of the brain. This can damage areas in the brain that control the rest of the body. Get help right away if any of these symptoms come on suddenly, even if the symptoms don t last.  Know the symptoms of a stroke     A sudden feeling of weakness on one side of your body may be a sign that you are having a stroke.     Weakness. You may feel a sudden weakness, tingling, or a loss of feeling on 1 side of your face or body including your arm or leg.     Vision problems. You may have sudden double vision or trouble seeing in 1 or both eyes.    Speech problems. You may have sudden trouble  talking, slurred speech, or problems understanding others.    Headache. You may have a sudden, severe headache.    Movement problems. You may have sudden trouble walking, dizziness, a feeling of spinning, a loss of balance, a feeling of falling, or blackouts.    Seizure. You may also have a seizure with a large or hemorrhagic stroke.   Remember: If you have any of these symptoms, call 911 and your doctor as soon as possible.  F.A.S.T. is an easy way to remember the signs of a stroke. When you see these signs, you will know that you need to call 911 fast.   F.A.S.T. stands for:    F is for face drooping - One side of the face is drooping or num. When the person smiles, the smile is uneven.    A is for arm weakness - One arm is weak or numb. When the person lifts both arms at the same time, one arm may drift downward.    S is for speech difficulty - You may notice slurred speech or difficulty speaking. The person can't repeat a simple sentence correctly when asked.    T is for time to dial 911 - If someone shows any of these symptoms, even if they go away, call 911 immediately. Make note of the time the symptoms first appeared.     0933-8980 The [employer]. [address] [place] [address], [place] [id number]. All rights reserved. This information is not intended as a substitute for professional medical care. Always follow your healthcare professional's instructions.                Preventing Recurrent Stroke: Getting Active  Being active is key to preventing stroke. It s good for your heart and lowers high blood pressure. It also helps you keep doing independent activities of daily living. Moving around also helps you recover lost skills. It s best if you re active at least 30 minutes each day. But this doesn t have to be at a gym. The key is finding things that fit your lifestyle and abilities. If you have trouble moving around, your doctor may prescribe physical therapy. The physical therapist can help you  develop  physical activity goals and provide an exercise prescription. Low- to moderate-intensity aerobic and muscle-strengthening activities are an important part of recovery .    Ways to get moving  After a stroke, you may not be able to do everything you used to. But there are still simple ways you can stay active:    Industry leaves or work in the garden.    Play with children or grandchildren.    Work on a hobby.    Park farther away from building entrances and walk.    Sweep or vacuum your living space.    Use the stairs instead of the elevator. If walking up is too strenuous, you can start by walking down.     If walking is hard, try stretching exercises or swimming.  Walk every day  Walking is great exercise. It s free, easy, and all you need is a good pair of shoes. Start with short walks. Then go a little farther each week. The tips below can help:    Warm up. Start off with a few minutes of strolling. Then walk at a brisker pace.    Walk every chance you get. Walk to do errands, for fun, or to visit friends. Visit a local park or explore your neighborhood.    Take a friend along. Having company can make it more fun.    Walk farther each week. Try walking a little farther or longer each week. You may be surprised by how fast you improve!  Stop if...  If you re new to exercising, it s normal to feel a little sore afterward. But you should stop right away if you:    Have trouble breathing.    Feel dizzy or extremely tired.    Have sharp pain.  Stick with it  Some days it may seem hard to be active. Plan ways to keep going anyway. Your health and life are on the line. Make a list of things that might keep you from exercising. Then write down what you can do to get around those things.   For family and friends  It s much easier for your loved one to be active when you join in. Try these tips:    Go for walks together.    Ask your loved one to join in during activities, such as making dinner.    If he or she starts making  excuses, suggest ways to overcome the roadblocks.    Cheer every improvement!     4926-7334 The [employer]. [address] [place] [address], [place] [address]. All rights reserved. This information is not intended as a substitute for professional medical care. Always follow your healthcare professional's instructions.                Healthy Lifestyle to Prevent Another Stroke  Breaking old habits can be hard. But when your health is at stake, it s never too late to make changes for the better. Some lifestyle changes might be easy for you. Others might be tough. So if you need help, talk with your doctor, family, and friends.  Make healthy changes       Diet. Your healthcare provider will give you information on dietary changes that you may need to make, based on your situation. Your provider may recommend that you see a registered dietitian for help with diet changes.      Weight management. If you are overweight, your healthcare provider will work with you to lose weight and lower your BMI (body mass index) to a normal or near-normal level. Making diet changes and increasing physical activity can help.      Stop smoking. If you smoke, the time to quit is now. There s no more time for excuses. Smoking raises blood pressure and damages arteries--both of which can lead to a stroke. To stop smoking, ask your doctor for help. Join a stop-smoking program. Make a list of reasons to quit, including that you'll lower your risk of lung cancer, and read it daily.    Limit alcohol. Drinking too much alcohol can raise blood pressure and increase the risk for stroke. Alcohol can also react with certain medicines. Ask your doctor if it s safe for you to drink alcohol.    Get support. A stroke can leave you feeling frustrated or depressed. Don t ignore your feelings, but don t dwell on them either. Focus on what you can do. Talking to family, friends, your doctor, or clergy can also help.    Reduce stress. Stress can make your  heart work harder and raise blood pressure. To reduce stress, try to let go of daily annoyances. Ask yourself if problems will still matter a week from now. Getting proper rest can also help. Finally, don t be embarrassed to ask for help when you need it.    Exercise. Strength and aerobic training improves your ability to function and do activities of daily living. It also reduces your risk for another stroke. Develop a custom plan with your physical therapist to meet activity goals.  If you have high blood pressure    One of the most important things you can do to prevent another stroke is to keep your blood pressure under control. If you have high blood pressure:    Take all your medicines as directed.    Get regular exercise. You should try to work up to getting at least 40 minutes of moderate to high intensity physical activity at least 3 to 4 days each week.     Talk with your doctor about limiting fat and salt in your diet. You most likely will be told to limit your salt intake to 2,400 milligrams (mg) or less each day.     Check your blood pressure regularly. Write down your numbers and bring them to checkups with your doctor.  Manage other health problems  Strokes are often closely related to certain health problems. These include high blood pressure, heart disease, and diabetes. If you have any of these conditions, it s more important than ever to keep them under control. Do this by taking any prescribed medicines and having regular checkups. Keep in mind, too, that the same healthy lifestyle choices that prevent stroke will also help control these health problems.  For family and friends  It s much harder for your loved one to make lifestyle changes if he or she is feeling low. So be on the lookout for sadness, depression, or hopelessness. These feelings are not uncommon after a stroke. Talk with the doctor if you have concerns.     7212-1379 The [employer]. [address] [facility], [place]  [address]. All rights reserved. This information is not intended as a substitute for professional medical care. Always follow your healthcare professional's instructions.                Effects of a Stroke on the Brain and Body  When blood supply is cut off from the brain, cells begin to die from lack of oxygen. Within minutes, skills such as reasoning, speech, and some degree of arm, leg, or facial movement may be lost. The type of skills and the amount of loss depend on which part of the brain was affected, and how much tissue was damaged.    1. The brain is the body s control center, which handles communication, motor, sensory, and processing functions.  2. The cerebellum controls the body s coordination and balance.  3. The brain stem links the brain and the spinal cord. It also handles basic body functions.  4. The spinal cord carries messages between the brain and the body.  A stroke causes lost skills  Each part of the brain has a role related to a function in the body. Damage to any part of the brain limits its ability to carry out its role. This results in lost skills or changes in function, and even in personality. Some parts of the brain and its correlating functions that may be affected by stroke are as follows:    The front of the brain houses reasoning and the ability to control emotions. Personality resides here.    The left side of the brain controls the right side of the body. It also handles speech, language, reading, and writing.    The right side of the brain controls the left side of the body.    The back of the brain controls vision.    The brain stem handles breathing and swallowing.    4153-0603 The [employer]. [address] [place] [address], [place] [address]. All rights reserved. This information is not intended as a substitute for professional medical care. Always follow your healthcare professional's instructions.                Arm Care After a Stroke  Many people who have a stroke are  left with problems with one of their arms. Proper arm care after a stroke can help treat these problems with your arm. It can also help prevent new problems from starting. Arm care may include placing the arms in the proper position, using devices such as a sling or brace, and taking care to prevent further injury during rehabilitation.  How a stroke can cause arm problems  Stroke often causes paralysis (hemiplegia) or weakness (hemiparalysis) of one or more of the muscles in your arm or shoulder. The muscles might feel tight instead of weak (spasticity). In general, stroke might increase or decrease the normal tension (muscle tone) in these muscles. You may also have numbness or limited feeling in your arm.  The shoulder is a main problem area after a stroke. The shoulder blade (scapula) and the upper arm bone (humerus) come together to form the shoulder joint. This joint is shaped like a ball and socket. Problems with the shoulder muscles can cause this joint to partly dislocate because of the weight of your arm. This partial separation (subluxation) makes your shoulder droop down.  Subluxation can cause pain when you move your arm. You may also feel like your shoulder is out of joint. Muscles, tendons, and ligaments can become overstretched. These muscle problems can lead to other problems with your shoulder. For example, you may not be able to move your shoulder as much as you used to. Some of the muscles may also be permanently shortened. This is called contracture.  How arm care helps  Arm care after a stroke helps prevent and treat problems. If you have had a stroke, it is very likely that you will need some sort of arm care treatment while you recover function. Most people who have a stroke need treatment for trouble with the muscles of their arm or shoulder, and shoulder pain is common. This treatment often starts right after a stroke. Even if you only have minor harm from your stroke, proper arm care can  help keep future problems from occurring.  Your treatment plan  Learn everything you can about your treatment plan. Your health care team will work with you to design a treatment plan to fit your needs. You may work with a physiatrist. This is a doctor who specializes in rehabilitative medicine. You will likely work with a physical therapist. This is a therapist who can teach you safe exercises to improve the strength, endurance, and range of motion in your arm, shoulder, and hand. An occupational therapist can help you learn to regain skills needed for everyday living using your arm. This may include using assistive devices, such as braces or arm rails.  Expect your treatment plan to change as you recover. Talk with the members of your medical team about how things are going. If an exercise causes pain, stop the exercise and let someone know right away.  Some people regain full use of their arm in the weeks after a stroke. Many others still have some weakness, pain, or other problems with their arm. You may continue to benefit from arm therapy. Your medical team can tailor your treatment plan to your needs.  Protecting your shoulder joint  Preventing subluxation is one of the most important goals of arm care after a stroke. To prevent this problem, you must protect your arm at the shoulder joint. You will need to control the shoulder joint when you move around. Make sure all of your caregivers know about the proper ways to help you.    Do not let anyone pull on your arm.    Do not let anyone help you stand or move by lifting under your armpits.    Do not lean your body weight on anything in your armpit while standing or walking.    Keep your affected arm supported and immobile when you stand up. Use your strong arm to help pull yourself up.    Keep your arm in a sling or harness after your stroke, if advised.    Support your affected arm while sitting. If you re in a wheelchair, rest it on one of the chair s arm.  You can also rest your arm on things such as a lap tray or pillow.  Other types of proper positioning    When lying on your unaffected side, use 1 or 2 pillows for your head. Your affected shoulder should be forward, with your arm resting on a pillow.    When lying on your affected side, use 1 or 2 pillows for your head. Your affected shoulder should be positioned comfortably.    When sitting up, sit fully back into the chair. Place your arms forward onto 2 pillows on a table. Your feet should be flat on the floor.    When lying on your back, place 3 pillows supporting both your shoulders and your head. Place your affected arm on a pillow.    When sitting in bed, sit upright, well supported by pillows. Place both arms on pillows. This is usually only recommended for limited periods.  Your physical therapist may advise other positions that are safe for you. You may also do physical therapy exercises. These are to help you regain strength and flexibility in your muscles.  Additional treatments  If you continue to have arm problems, your health care team might try other treatments such as:    Constraint-induced movement therapy. This involves using your affected arm a lot and not using your unaffected arm. A therapist might help you with this. Or it could be robot-assisted.    Botulinum toxin injections. This can help to reduce tightness in the arm muscles.    Electrical stimulation of muscles. The weakened muscles in your arm or shoulder may be treated with electricity. This can help strengthen your weakened arm.    Electrical stimulation of the brain. This may be done during rehabilitation exercises and may help you be able to move your arm better.    Motor imagery. This method may help you be able to use your arm more easily.    Biofeedback exercises. These may help you be able to move your arm better and reduce pain.    Pain medicine. These may be needed to ease shoulder pain if subluxation has occurred.  Depending  on your situation, these treatments might be used early or late in your therapy. Ongoing physical therapy may also help you reduce chronic pain as you regain your strength and flexibility.    4976-1826 The [employer]. [address] [place] [address], [place] [address]. All rights reserved. This information is not intended as a substitute for professional medical care. Always follow your healthcare professional's instructions.

## 2017-01-04 NOTE — SMOKING CESSATION
"   01/04/17 1708   General Information   Patient is receptive to smoking cessation at this time Yes   Packs Per Day 0.33 PPD  (16 to 57 years old 1/day, last year 1 pack every 3 days)   Years Smoked (#) 42 yrs   Cigarette Pack Years 13.86   Stage of Behavior Change   Patient's Stage of Behavior Change Contemplation \"I might (within the next 60 days\"   Processes of Change   The following interventions were used (smoking cessation) Increase awareness of effects of smoking on health;Accept responsibility for effects of smoking;Develop strategies to increase confidence to quit;Identify healthy alternatives;Identify support system;Problem-solve barriers to quitting   Motivation to Quit Scale (1-10) 10   Confidence to Quit Scale (1-10) 2   Quit Attempt Date 01/04/17   Education/Recommendations   Education [place] Quit Lines phone follow up program;QUIT PLAN phone line   Recommendations Nicotine Replacement;Other (Comment)  (quit plan resources)   Treatment Time (Minutes) 8 minutes   Total Evaluation Time   Total Evaluation Time (Minutes) 8     "

## 2017-01-04 NOTE — PLAN OF CARE
Problem: Goal Outcome Summary  Goal: Goal Outcome Summary  OT 4A:  OT orders received and reviewed. Per chart review and discussion with interdisciplinary team patient on strict bedrest and not appropriate for therapy today, will schedule as appropriate.

## 2017-01-04 NOTE — PHARMACY
Pharmacy Stroke Code Response    Pharmacist responded as part of the Stroke Code Team activation to patient care area ED11.      Patient weight (in kg): 108.   Weight was obtained from __ [ED gurney weight, daily inpatient weight]    The Stroke Team determined that the patient was a candidate for IV alteplase therapy and requested that alteplase be made at 0402.    Dose of alteplase:  A total dose of 90 mg was calculated.  This is to be given as a 9 mg bolus over 1 minute followed by a 81 mg infusion over 1 hour.     The alteplase bolus was handed off to nursing at 0416.    The bolus was administered at 421 and the infusion was initiated at 423.  These were administered in ED.    The pharmacist entered the alteplase bolus and infusion drug orders into [facility].  All remaining orders will be entered by neurology.

## 2017-01-04 NOTE — IP AVS SNAPSHOT
[address] [address]    Phone:  [phone]                                       After Visit Summary   1/4/2017    Verena Velasquez    MRN: [id number]           After Visit Summary Signature Page     I have received my discharge instructions, and my questions have been answered. I have discussed any challenges I see with this plan with the nurse or doctor.    ..........................................................................................................................................  Patient/Patient Representative Signature      ..........................................................................................................................................  Patient Representative Print Name and Relationship to Patient    ..................................................               ................................................  Date                                            Time    ..........................................................................................................................................  Reviewed by Signature/Title    ...................................................              ..............................................  Date                                                            Time

## 2017-01-04 NOTE — DISCHARGE SUMMARY
General acute hospital, [place]    Neurology Stroke Discharge Summary    Date of Admission: 1/4/2017  Date of Discharge:  1/5/2017  Disposition: Discharged to home  Primary Care Physician: Erlin Webster      Admission Diagnosis:   Acute ischemic stroke    Discharge Diagnosis:   Ischemic Stroke due to undetermined etiology    Problem Leading to Hospitalization (from [facility]):   Right hand weakness for 2 hours    Please see H&P dated 1/4/2017 for further details about presentation.    Brief Hospital Course:   Verena Velasquez is a 56 year old right handed female with a history of HTN, chronic regional pain syndrome, tobacco use, and anxiety who presented from an outside hospital for right hand weakness starting at 0130 on 1/4/16, which is the last known normal.  She then presented to an outside hospital at 0330 on 1/4/16 and was noted to have an NIHSS of 2 for right hand weakness and RUE dysmetria.  CT head showed evidence of chronic small vessel disease and CTA was unremarkable.  TPA was administered at 0421 on 1/4/16. She was then transferred to [facility] and on arrival her NIHSS was noted to be 1 for RUE dysmetria. She was found to have an ischemic infarct of the left precentral gyrus.  Echocardiogram with bubble study showed the presence of a PFO.  US of the lower extremity was negative for DVT. In the presence of PFO and no DVT will continue with aspirin therapy.     Work-up as stated below under Pertinent Investigations.    Etiology is thought to be undetermined at this time.      Rehab evaluation: OT, PT, SLP and PM&R.     Smoking Cessation: education provided. Patient try to quit.  She has nicotine patches at home but would like to try nicotine gum.  Discharged home with nicotine gum.    BP Long-term Goal: 140/90 or less    Antithrombotic/Anticoagulant Agent: aspirin 81 mg    Statins: Started on atorvastatin 40mg daily      Hgb A1C Goal: < 7.0    Complications: None.     Other problems addressed  "during this hospitalization:  #Complex Regional Pain Syndrome: Continued PTA  topiramate and norco.    #HTN per chart review. No antihypertensives PTA. Discussed with patient and she was placed on antihypertensives over 2 years ago but were discontinued as she was normotensive.  She did not require prn antihypertensives.   #COPD per chart review.  No history of PFTs . Prescribed home inhalers but she stated she has not used them in over a year. She did not require inhalers during hospitalization. No acute concerns at this time.  #GERD: Continued PTA nexium and sucralfate.  #Anxiety: Continued PTA lexapro and prn valium.    PERTINENT INVESTIGATIONS    Labs  Lipid Panel:   Recent Labs   Lab Test  01/04/17   0753   CHOL  227*   HDL  38*   LDL  156*   TRIG  166*     A1C: A1C      5.1   1/4/2017  INR:     Recent Labs  Lab 01/04/17  0922 01/04/17  0413   INR 1.14 0.95      Coag Panel / Hypercoag Workup: Not indicated  Pending test results: None    Echo w/bubble study: read as \"No significant valvular abnormalities were noted. Left ventricular function, chamber size, wall motion, and wall thickness are normal.The EF is 55-60%. Right ventricular function, chamber size, wall motion, and thickness are normal. Patent foramen ovale was demonstrated by agitated saline bubble study.\"     Imaging:     CT head w/o contrast: read as \"Few scattered nonspecific white matter lesions. No evidence for intracranial hemorrhage or any acute process.    CTA: read as \"Minimal atherosclerotic disease involving the carotid bifurcations and brachiocephalic vessels without stenosis. No evidence for stenosis, dissection, thromboembolism, or aneurysm    MRI brain w/wo contrast: read as \"Small area of acute to subacute infarct (4 hours to 10 days) in the left precentral gyrus. Sequela of mild chronic small vessel ischemic disease. No intracranial hemorrhage or mass effect.\"    US lower extremity: No evidence of DVT    Endovascular procedure: None "     Cardiac Monitoring: Patient had > 24 hrs of cardiac monitor while in hospital.    Findings: No atrial fibrillation was found. Discharged home with 14 day Zio Patch monitor.    Sleep Apnea Screen:   Questions/Answers      1. Prior to your stroke, have you been told that you snore? Yes.    2. Prior to your stroke, have you been told that you struggle to breath while you are sleeping? No.    3. Prior to your stroke, do you feel tired and sleepy even after getting a normal night of sleep? Yes.    Sleep Apnea Screen Findings: Patient has 2 or more symptoms of sleep apnea.  Outpatient sleep study is recommended.    PHQ-9 Depression Screen Score: 10    Stroke Education was provided including: stroke warning signs, EMS activation, medication changes, follow-up instructions/plan, and risk factor management plan.      PHYSICAL EXAMINATION  Vital Signs:  B/P: 111/46, T: 97.6, P: Data Unavailable, R: 18    General:  patient lying in bed without any acute distress     HEENT:  NC/AT, MMM  Cardio:  RRR, no murmurs appreciated  Pulmonary:  CTAB, no wheezes or rales, no increased work of breathing  Extremities:  Warm and well perfused, no pedal edema, no erythema or swelling  Skin:  No rashes or lesions     Neurologic  Mental Status:  Alert, oriented to person, place, and time.  Interacting appropriately and able to follow commands.  No evidence of aphasia.  Cranial Nerves:  PERRL, visual fields intact, EOMI with normal smooth pursuit, facial sensation intact and symmetric, facial movements symmetric, hearing not formally tested but intact to conversation, palate elevation symmetric and uvula midline, no dysarthria, shoulder shrug strong bilaterally, tongue protrusion midline  Motor:  No abnormal movements, normal tone throughout, normal muscle bulk, no pronator drift.  Strength 5/5 throughout LUE, LLE, and RLE.  Strength 5/5 with right shoulder abduction, arm flexion/extension. 3+/5 right wrist flexion/extension.  Unable to  adduct/abduct left fingers. Normal ROM with right thumb.     Reflexes:  2+ and symmetric throughout, toes mute  Sensory:  intact/symmetric to light touch throughout upper and lower extremities. No evidence of neglect.  Coordination:  FNF and HS intact without dysmetria.   Station/Gait:  Deferred    National Institutes of Health Stroke Scale (on day of discharge)  NIHSS Total Score: 0    Modified [place] Scale (on day of discharge): 2-Slight disability; unable to carry out all previous activities, but able to look after own affairs    Medications    Current Discharge Medication List      START taking these medications    Details   atorvastatin (LIPITOR) 40 MG tablet Take 1 tablet (40 mg) by mouth daily  Qty: 30 tablet, Refills: 3    Associated Diagnoses: Cerebrovascular accident (CVA), unspecified mechanism (H)      nicotine polacrilex (NICORETTE) 2 MG gum Place 1 each (2 mg) inside cheek every hour as needed for smoking cessation  Qty: 20 each, Refills: 0    Associated Diagnoses: Cerebrovascular accident (CVA), unspecified mechanism (H)      aspirin 325 MG EC tablet Take 1 tablet (325 mg) by mouth daily  Qty: 60 tablet, Refills: 1    Associated Diagnoses: Cerebrovascular accident (CVA), unspecified mechanism (H)         CONTINUE these medications which have NOT CHANGED    Details   diazepam (VALIUM) 5 MG tablet Take 5 mg by mouth every 6 hours as needed      escitalopram (LEXAPRO) 10 MG tablet Take 20 mg by mouth daily      traZODone (DESYREL) 50 MG tablet Take 125 mg by mouth At Bedtime      fentaNYL (DURAGESIC) 25 mcg/hr patch 72 hr Place 1 patch onto the skin every 72 hours      Esomeprazole Magnesium (NEXIUM PO) Take 40 mg by mouth 2 times daily       !! TOPIRAMATE PO Take 300 mg by mouth At Bedtime       !! TOPIRAMATE PO Take 200 mg by mouth every morning       Cyclobenzaprine HCl (FLEXERIL PO) Take 5 mg by mouth 3 times daily Pt reports she takes 3 tablets at bedtime      DiphenhydrAMINE HCl (BENADRYL PO) Take  75 mg by mouth      HYDROcodone-acetaminophen (NORCO) 5-325 MG per tablet Take 1 tablet by mouth every 6 hours as needed for moderate to severe pain      nitroglycerin (NITROSTAT) 0.4 MG sublingual tablet       albuterol (2.5 MG/3ML) 0.083% nebulizer solution Take 1 vial (2.5 mg) by nebulization every 4 hours as needed for shortness of breath / dyspnea (wheezing/cough)  Qty: 1 Box, Refills: 0    Associated Diagnoses: Obstructive chronic bronchitis with exacerbation (H)      sucralfate (CARAFATE) 1 GM/10ML suspension Take 10 mLs (1 g) by mouth 4 times daily  Qty: 420 mL, Refills: 1      fluticasone (FLOVENT HFA) 110 MCG/ACT inhaler Inhale 2 puffs into the lungs 2 times daily      ipratropium - albuterol 0.5 mg/2.5 mg/3 mL (DUONEB) 0.5-2.5 (3) MG/3ML nebulization Inhale 1 vial (3 mLs) into the lungs every 6 hours as needed for shortness of breath / dyspnea or wheezing  Qty: 150 mL, Refills: 0       !! - Potential duplicate medications found. Please discuss with provider.          Additional recommendations and follow up:      Occupational Therapy Referral     Reason for your hospital stay   Right hand weakness found to have a stroke.     Adult [facility] Follow-up and recommended labs and tests   Follow up with primary care provider, Erlin Webster, within 7 days for hospital follow- up.      Please contact Stroke Clinic- Neurology Clinic at [phone], pick option #1,  if you have not been contacted to schedule a stroke follow up appointment in 5 days of discharge. At stroke clinic results of Zio Patch will be discussed.    If you have other stroke related questions, please call [phone], pick option #3, and ask to speak to Edwar Urias RN Stroke/Endovascular Care Coordinator.    If you have any urgent stroke related questions after hours, please contact the hospital  at [phone] and ask to be connected to a stroke provider.     Appointments on [place] and/or [facility] (with [facility] or [facility]  provider or service). Call [phone] if you haven't heard regarding these appointments within 7 days of discharge.     Activity   Your activity upon discharge: activity as tolerated     When to contact your care team   Please go to the emergency room if you are having any vision changes, changes in speech, facial droop, difficulty swallowing, weakness, or numbness as these can be symptoms of stroke.     Discharge Instructions   1) Follow up with PCP within 7 days for hospital follow up  2) Follow up in stroke clinic. Please Clinic as noted above to schedule appointment.  3) Zio Patch cardiac monitor for two weeks.  Results will be discussed at stroke follow up.  4) Start aspirin 325mg, atorvastatin 40mg, and nicotine gum as needed  5) Follow up with Occupational Therapy  5)     Full Code     Diet   Follow this diet upon discharge: Orders Placed This Encounter  Regular Diet Adult       Patient was seen and discussed with the Attending, Dr. Álvarez.    Meek Bernard  Neurology PGY1  Pager: [phone]

## 2017-01-04 NOTE — ED PROVIDER NOTES
History     Chief Complaint   Patient presents with     Numbness     HPI  Verena Velasquez is a 56 year old female who resents to the ED with acute onset of right hand paralysis and right arm weakness.  She was at home watching TV.  1 minute she was able to work the remote control buttons and the next minute she wanted to push a button and her hand would not respond.  This was at 1:30 AM.  She presents to the ED at about 3:30 AM, 2 hours after the onset of her symptoms.  He had a brief headache but she gets lots of headaches and would not even be here for that.  That has resolved.  She's had no speech difficulty.  No visual changes.  No leg weakness.  Left arm is normal.  She is right-hand dominant.    No hypertension, no diabetes, no hypercholesterolemia, no history of strokes.    Past Medical History   Diagnosis Date     Hypertension      Gastro-oesophageal reflux disease      COPD (chronic obstructive pulmonary disease) (H)      Chronic pain        Past Surgical History   Procedure Laterality Date     Orthopedic surgery       Gyn surgery         Social History     Social History     Marital Status:      Spouse Name: N/A     Number of Children: N/A     Years of Education: N/A     Occupational History     Not on file.     Social History Main Topics     Smoking status: Current Every Day Smoker -- 1.00 packs/day     Smokeless tobacco: Not on file     Alcohol Use: No     Drug Use: No     Sexual Activity: Not on file     Other Topics Concern     Not on file     Social History Narrative          Allergies   Allergen Reactions     Gabapentin      Lyrica [Pregabalin]      Morphine      Penicillins      Tramadol        Med List Reviewed         Review of Systems   Constitutional: Negative for fever.   Eyes: Negative for visual disturbance.   Respiratory: Negative for cough and shortness of breath.    Cardiovascular: Negative for chest pain.   Gastrointestinal: Negative for nausea and abdominal pain.  "  Genitourinary: Negative for difficulty urinating.   Musculoskeletal:        RSD in right ankle, very sensitive.  Has to cut out a hold in her sock over the lateral malleolus   Neurological: Positive for weakness (RUE) and headaches (earlier tongiht but not now). Negative for dizziness, facial asymmetry, speech difficulty, light-headedness and numbness.   Psychiatric/Behavioral: Negative for confusion.   All other systems reviewed and are negative.      Physical Exam   BP: 152/82 mmHg  Heart Rate: 104  Temp: 98.4  F (36.9  C)  Resp: 20  Height: 165.1 cm (5' 5\")  Weight: 108.863 kg (240 lb)  SpO2: 99 %  Physical Exam   Constitutional: She is oriented to person, place, and time. She appears well-developed and well-nourished. No distress.   HENT:   Mouth/Throat: Oropharynx is clear and moist.   Eyes: EOM are normal. Pupils are equal, round, and reactive to light.   Neck: Neck supple.   Cardiovascular: Normal rate, regular rhythm and normal heart sounds.    No murmur heard.  Pulmonary/Chest: Effort normal and breath sounds normal. No respiratory distress.   Abdominal: Soft. Bowel sounds are normal. There is no tenderness.   Neurological: She is alert and oriented to person, place, and time. No cranial nerve deficit or sensory deficit. Coordination (right UE) abnormal. GCS eye subscore is 4. GCS verbal subscore is 5. GCS motor subscore is 6.   Right hand is paralyzed.  Right upper extremity is weak although she can resist gravity.  Left upper extremity is normal in both legs are normal.  No facial droop.   Skin: Skin is warm and dry. No rash noted. She is not diaphoretic.   Psychiatric: She has a normal mood and affect.       ED Course    Patient presents to the ED about 2 hours after the onset of her acute right hand paralysis and right upper extremity weakness.  She was quickly examined and sent to CT as we are under a bit of a time crunch if she is going to be a candidate for tPA.  While she was in CT, I called " [facility] and spoke with Dr. Álvarez from the stroke team.  He agreed that if she has no contraindications to go ahead and give her tPA and plan for transfer to the [facility].  He requested that we go ahead and get the CTA as long as she is in the scanner while pharmacy is mixing up the tPA.      4:00 AM;  Radiology called with her CT results.  No evidence of hemorrhage.  She does have chronic white matter changes.  tPA was ordered.   She will be transferred by ALS ambulance.  She did request pain medication prior to transfer.  Her back and hips bother her and she would typically be taking some Vicodin for discomfort.  I think that is reasonable.  The rig has fentanyl on board so we will start with that in the ED.    4:34 AM:  Radiology called with her CTA results.  No aneurysm or significant stenosis in the proximal intracranial vessels.  No significant stenosis or dissection in the neck vessels.           Procedures        EKG:  Sinus rhythm at 91 bpm.  No acute ischemic changes.  Normal EKG.    Critical Care time:  was 45 minutes for this patient excluding procedures.     The patient has stroke symptoms:           ED Stroke specific documentation           NIHSS PDF          Protocol PDF     Patient last known well time: 0130  ED Provider first to bedside at: 0334  CT Results received at: 0400  Patient was treated with TPA.  The risks and benefits of TPA were reviewed using the risk information document.  These risks included bleeding complications such as intracranial bleeding and death. The patient or patient s surrogate s decisional capacity was assessed to be intact. TPA decision was made after this informed consent.    National Institutes of Health Stroke Scale (Baseline)  Time Performed: 0340      Score    Level of consciousness: (0)   Alert, keenly responsive    LOC questions: (0)   Answers both questions correctly    LOC commands: (0)   Performs both tasks correctly    Best gaze:  (0)   Normal    Visual: (0)   No visual loss    Facial palsy: (0)   Normal symmetrical movements    Motor arm (left): (0)   No drift    Motor arm (right): (1)   Drift of the arm,  but no use, at all,  of her right hand    Motor leg (left): (0)   No drift    Motor leg (right): (0)   No drift    Limb ataxia: (1)   Present in one limb    Sensory: (0)   Normal- no sensory loss    Best language: (0)   Normal- no aphasia    Dysarthria: (0)   Normal    Extinction and inattention: (0)   No abnormality        Total Score:  2        Stroke Mimics were considered (including migraine headache, seizure disorder, hypoglycemia (or hyperglycemia), head or spinal trauma, CNS infection, Toxin ingestion and shock state (e.g. sepsis) .        Results for orders placed or performed during the hospital encounter of 01/04/17 (from the past 24 hour(s))   CBC with platelets differential   Result Value Ref Range    WBC 8.8 4.0 - 11.0 10e9/L    RBC Count 3.61 (L) 3.8 - 5.2 10e12/L    Hemoglobin 11.2 (L) 11.7 - 15.7 g/dL    Hematocrit 35.4 35.0 - 47.0 %    MCV 98 78 - 100 fl    MCH 31.0 26.5 - 33.0 pg    MCHC 31.6 31.5 - 36.5 g/dL    RDW 14.4 10.0 - 15.0 %    Platelet Count 306 150 - 450 10e9/L    Diff Method Automated Method     % Neutrophils 61.3 %    % Lymphocytes 26.7 %    % Monocytes 7.5 %    % Eosinophils 4.3 %    % Basophils 0.1 %    % Immature Granulocytes 0.1 %    Absolute Neutrophil 5.4 1.6 - 8.3 10e9/L    Absolute Lymphocytes 2.3 0.8 - 5.3 10e9/L    Absolute Monocytes 0.7 0.0 - 1.3 10e9/L    Absolute Eosinophils 0.4 0.0 - 0.7 10e9/L    Absolute Basophils 0.0 0.0 - 0.2 10e9/L    Abs Immature Granulocytes 0.0 0 - 0.4 10e9/L   INR   Result Value Ref Range    INR 0.95 0.86 - 1.14   Partial thromboplastin time   Result Value Ref Range    PTT 30 22 - 37 sec      Other labs pending.    Assessments & Plan (with Medical Decision Making)    (I63.40) Cerebral infarction due to embolism of cerebral artery (H)  Comment: right hand paralysis and  right arm weakness  Plan: tPA was started in the ED.  She will be transferred to the [facility].    Dr Álvarez, from the stroke team, is the accepting physician.             I have reviewed the nursing notes.    I have reviewed the findings, diagnosis, plan and need for follow up with the patient.    New Prescriptions    No medications on file       Final diagnoses:   Cerebral infarction due to embolism of cerebral artery (H) - right hand paralysis and right arm weakness       1/4/2017   [facility] EMERGENCY DEPARTMENT      Jaylen Morgan MD  01/04/17 0430    Jaylen Morgan MD  01/04/17 0435    Jaylen Morgan MD  01/04/17 0453

## 2017-01-04 NOTE — PROGRESS NOTES
01/04/17 1607   Quick Adds   Type of Visit Initial Occupational Therapy Evaluation   Living Environment   Lives With child(anjel), adult;grandchild(anjel);spouse  (granddaughter, 12 y.o.)   Living Arrangements house;other (see comments)  (mother in law apartment)   Home Accessibility bed and bath on same level;other (see comments);stairs within home  (walk in shower)   Number of Stairs to Enter Home 0   Number of Stairs Within Home 0   Stair Railings at Home none   Transportation Available car;family or friend will provide   Living Environment Comment does not access stairs   Self-Care   Dominant Hand right   Usual Activity Tolerance fair   Current Activity Tolerance fair   Regular Exercise no   Equipment Currently Used at Home cane, straight   Functional Level Prior   Ambulation 1-->assistive equipment   Transferring 1-->assistive equipment   Toileting 0-->independent   Bathing 0-->independent   Dressing 0-->independent   Eating 0-->independent   Communication 0-->understands/communicates without difficulty   Swallowing 0-->swallows foods/liquids without difficulty   Cognition 1 - attention or memory deficits   Fall history within last six months yes   Number of times patient has fallen within last six months 6   Which of the above functional risks had a recent onset or change? fall history;ambulation       Present no   Language English   General Information   Onset of Illness/Injury or Date of Surgery - Date 01/04/17   Referring Physician Meek Bernard MD   Patient/Family Goals Statement get back to baseline   Additional Occupational Profile Info/Pertinent History of Current Problem presented from outside hospital with 2 hours of right hand weakness and dysmetria s/p tPA.   Precautions/Limitations fall precautions   Weight-Bearing Status - LUE full weight-bearing   Weight-Bearing Status - RUE full weight-bearing   Weight-Bearing Status - LLE full weight-bearing   Weight-Bearing  Status - RLE full weight-bearing   General Info Comments activity:  bedrest 24 hours post TPA, BID chair and progress as tolerated   Cognitive Status Examination   Orientation orientation to person, place and time   Level of Consciousness alert   Able to Follow Commands WNL/WFL   Personal Safety (Cognitive) WNL/WFL   Memory impaired   Attention Distractible during evaluation;Quiet environment required   Cognitive Comment endorses memory and concentration below baseline   Visual Perception   Visual Perception Wears glasses   Visual Perception Comments cataracts both eyes   Sensory Examination   Sensory Quick Adds No deficits were identified   Pain Assessment   Patient Currently in Pain No   Range of Motion (ROM)   ROM Quick Adds Other (describe)   ROM Comment R hand limited flexion/extension digits 2-5 MPC/DIP/PIP, 2nd digit increased ROM MCP/PIP/DIP   Strength   Manual Muscle Testing Quick Adds Other   Strength Comments B UE gross screen MMT 4/5   Hand Strength   Hand Strength Comments fair L hand and poor R hand   Muscle Tone Assessment   Muscle Tone Quick Adds No deficits were identified   Coordination   Coordination Comments R UE FMC deficits   Instrumental Activities of Daily Living (IADL)   Previous Responsibilities medication management;finances;driving;laundry   IADL Comments assist from family   Activities of Daily Living Analysis   Impairments Contributing to Impaired Activities of Daily Living cognition impaired;pain;strength decreased;ROM decreased;coordination impaired;fear and anxiety   General Therapy Interventions   Planned Therapy Interventions ADL retraining;IADL retraining;cognition;fine motor coordination training;strengthening;ROM;home program guidelines;progressive activity/exercise;risk factor education   Clinical Impression   Criteria for Skilled Therapeutic Interventions Met yes, treatment indicated   OT Diagnosis risk of deconditioning impacting I with ADLs   Assessment of Occupational  "Performance 3-5 Performance Deficits   Identified Performance Deficits dressing, home management, bathing, self feeding, toileting   Clinical Decision Making (Complexity) Low complexity   Therapy Frequency other (see comments)  (6x/week)   Predicted Duration of Therapy Intervention (days/wks) 2 weeks 1/17/17   Anticipated Equipment Needs at Discharge shower chair;other (see comments);bathing equipment  (rw/SEC)   Anticipated Discharge Disposition Home with Assist;Home with Outpatient Therapy;Transitional Care Facility   Risks and Benefits of Treatment have been explained. Yes   Patient, Family & other staff in agreement with plan of care Yes   [facility] TM \"6 Clicks\"   2016, Trustees of [facility], under license to [employer].  All rights reserved.   6 Clicks Short Forms Daily Activity Inpatient Short Form   [facility]  \"6 Clicks\" Daily Activity Inpatient Short Form   1. Putting on and taking off regular lower body clothing? 4 - None   2. Bathing (including washing, rinsing, drying)? 4 - None   3. Toileting, which includes using toilet, bedpan or urinal? 4 - None   4. Putting on and taking off regular upper body clothing? 4 - None   5. Taking care of personal grooming such as brushing teeth? 4 - None   6. Eating meals? 4 - None   Daily Activity Raw Score (Score out of 24.Lower scores equate to lower levels of function) 24   Total Evaluation Time   Total Evaluation Time (Minutes) 5     "

## 2017-01-04 NOTE — PLAN OF CARE
Problem: Goal Outcome Summary  Goal: Goal Outcome Summary  PT 4A: PT orders received and reviewed. Per chart review and discussion with interdisciplinary team patient on strict bedrest following tPA and not appropriate for therapy today, will schedule as appropriate. Noé.

## 2017-01-04 NOTE — ED NOTES
Pt at 1:30 this morning was unable to move her right hand, doctor notified of pt code stroke called

## 2017-01-05 ENCOUNTER — APPOINTMENT (OUTPATIENT)
Dept: ULTRASOUND IMAGING | Facility: CLINIC | Age: 57
DRG: 065 | End: 2017-01-05
Attending: INTERNAL MEDICINE
Payer: MEDICARE

## 2017-01-05 ENCOUNTER — APPOINTMENT (OUTPATIENT)
Dept: OCCUPATIONAL THERAPY | Facility: CLINIC | Age: 57
DRG: 065 | End: 2017-01-05
Attending: INTERNAL MEDICINE
Payer: MEDICARE

## 2017-01-05 VITALS
RESPIRATION RATE: 11 BRPM | OXYGEN SATURATION: 97 % | DIASTOLIC BLOOD PRESSURE: 61 MMHG | WEIGHT: 238.32 LBS | BODY MASS INDEX: 39.66 KG/M2 | TEMPERATURE: 97.8 F | SYSTOLIC BLOOD PRESSURE: 105 MMHG

## 2017-01-05 LAB
ANION GAP SERPL CALCULATED.3IONS-SCNC: 8 MMOL/L (ref 3–14)
BUN SERPL-MCNC: 10 MG/DL (ref 7–30)
CALCIUM SERPL-MCNC: 8.1 MG/DL (ref 8.5–10.1)
CHLORIDE SERPL-SCNC: 112 MMOL/L (ref 94–109)
CO2 SERPL-SCNC: 24 MMOL/L (ref 20–32)
CREAT SERPL-MCNC: 0.83 MG/DL (ref 0.52–1.04)
ERYTHROCYTE [DISTWIDTH] IN BLOOD BY AUTOMATED COUNT: 14.5 % (ref 10–15)
GFR SERPL CREATININE-BSD FRML MDRD: 70 ML/MIN/1.7M2
GLUCOSE BLDC GLUCOMTR-MCNC: 102 MG/DL (ref 70–99)
GLUCOSE BLDC GLUCOMTR-MCNC: 89 MG/DL (ref 70–99)
GLUCOSE SERPL-MCNC: 106 MG/DL (ref 70–99)
HCT VFR BLD AUTO: 35.8 % (ref 35–47)
HGB BLD-MCNC: 11.2 G/DL (ref 11.7–15.7)
MCH RBC QN AUTO: 31.2 PG (ref 26.5–33)
MCHC RBC AUTO-ENTMCNC: 31.3 G/DL (ref 31.5–36.5)
MCV RBC AUTO: 100 FL (ref 78–100)
PLATELET # BLD AUTO: 277 10E9/L (ref 150–450)
PLATELET # BLD AUTO: 277 10E9/L (ref 150–450)
POTASSIUM SERPL-SCNC: 3.8 MMOL/L (ref 3.4–5.3)
RBC # BLD AUTO: 3.59 10E12/L (ref 3.8–5.2)
SODIUM SERPL-SCNC: 144 MMOL/L (ref 133–144)
WBC # BLD AUTO: 6.8 10E9/L (ref 4–11)

## 2017-01-05 PROCEDURE — 93970 EXTREMITY STUDY: CPT

## 2017-01-05 PROCEDURE — 80048 BASIC METABOLIC PNL TOTAL CA: CPT | Performed by: STUDENT IN AN ORGANIZED HEALTH CARE EDUCATION/TRAINING PROGRAM

## 2017-01-05 PROCEDURE — 97535 SELF CARE MNGMENT TRAINING: CPT | Mod: GO | Performed by: OCCUPATIONAL THERAPIST

## 2017-01-05 PROCEDURE — 00000146 ZZHCL STATISTIC GLUCOSE BY METER IP

## 2017-01-05 PROCEDURE — 25000132 ZZH RX MED GY IP 250 OP 250 PS 637: Mod: GY | Performed by: STUDENT IN AN ORGANIZED HEALTH CARE EDUCATION/TRAINING PROGRAM

## 2017-01-05 PROCEDURE — 85027 COMPLETE CBC AUTOMATED: CPT | Performed by: PHYSICAL MEDICINE & REHABILITATION

## 2017-01-05 PROCEDURE — 97530 THERAPEUTIC ACTIVITIES: CPT | Mod: GO | Performed by: OCCUPATIONAL THERAPIST

## 2017-01-05 PROCEDURE — 0298T ZZC EXT ECG > 48HR TO 21 DAY REVIEW AND INTERPRETATN: CPT | Performed by: INTERNAL MEDICINE

## 2017-01-05 PROCEDURE — 40000133 ZZH STATISTIC OT WARD VISIT: Performed by: OCCUPATIONAL THERAPIST

## 2017-01-05 PROCEDURE — 36415 COLL VENOUS BLD VENIPUNCTURE: CPT | Performed by: STUDENT IN AN ORGANIZED HEALTH CARE EDUCATION/TRAINING PROGRAM

## 2017-01-05 PROCEDURE — 0296T ZIO PATCH HOLTER: CPT | Performed by: STUDENT IN AN ORGANIZED HEALTH CARE EDUCATION/TRAINING PROGRAM

## 2017-01-05 PROCEDURE — A9270 NON-COVERED ITEM OR SERVICE: HCPCS | Mod: GY | Performed by: STUDENT IN AN ORGANIZED HEALTH CARE EDUCATION/TRAINING PROGRAM

## 2017-01-05 RX ORDER — ASPIRIN 325 MG
325 TABLET, DELAYED RELEASE (ENTERIC COATED) ORAL DAILY
Qty: 60 TABLET | Refills: 1 | Status: SHIPPED
Start: 2017-01-05 | End: 2017-03-22

## 2017-01-05 RX ORDER — ATORVASTATIN CALCIUM 40 MG/1
40 TABLET, FILM COATED ORAL DAILY
Qty: 30 TABLET | Refills: 3 | Status: SHIPPED
Start: 2017-01-05

## 2017-01-05 RX ORDER — ASPIRIN 81 MG/1
81 TABLET, CHEWABLE ORAL DAILY
Status: DISCONTINUED | OUTPATIENT
Start: 2017-01-05 | End: 2017-01-05 | Stop reason: HOSPADM

## 2017-01-05 RX ORDER — ASPIRIN 81 MG/1
81 TABLET, CHEWABLE ORAL DAILY
Qty: 36 TABLET | Refills: 3 | Status: SHIPPED
Start: 2017-01-05 | End: 2017-01-05

## 2017-01-05 RX ADMIN — TOPIRAMATE 200 MG: 50 TABLET ORAL at 07:58

## 2017-01-05 RX ADMIN — PANTOPRAZOLE SODIUM 40 MG: 40 TABLET, DELAYED RELEASE ORAL at 07:59

## 2017-01-05 RX ADMIN — ASPIRIN 81 MG CHEWABLE TABLET 81 MG: 81 TABLET CHEWABLE at 07:59

## 2017-01-05 RX ADMIN — ATORVASTATIN CALCIUM 40 MG: 40 TABLET, FILM COATED ORAL at 07:59

## 2017-01-05 RX ADMIN — SENNOSIDES AND DOCUSATE SODIUM 1 TABLET: 8.6; 5 TABLET ORAL at 07:59

## 2017-01-05 RX ADMIN — POLYETHYLENE GLYCOL 3350 17 G: 17 POWDER, FOR SOLUTION ORAL at 07:59

## 2017-01-05 RX ADMIN — HYDROCODONE BITARTRATE AND ACETAMINOPHEN 1 TABLET: 5; 325 TABLET ORAL at 02:07

## 2017-01-05 RX ADMIN — NICOTINE 1 PATCH: 7 PATCH, EXTENDED RELEASE TRANSDERMAL at 08:08

## 2017-01-05 RX ADMIN — ESCITALOPRAM OXALATE 20 MG: 20 TABLET ORAL at 07:59

## 2017-01-05 ASSESSMENT — VISUAL ACUITY
OU: NORMAL ACUITY

## 2017-01-05 ASSESSMENT — PATIENT HEALTH QUESTIONNAIRE - PHQ9: SUM OF ALL RESPONSES TO PHQ QUESTIONS 1-9: 10

## 2017-01-05 ASSESSMENT — PAIN DESCRIPTION - DESCRIPTORS: DESCRIPTORS: ACHING

## 2017-01-05 NOTE — PLAN OF CARE
Problem: Goal Outcome Summary  Goal: Goal Outcome Summary  Outcome: No Change  Status  D/I: Patient on unit 4A Surgical/Neuro ICU s/p ischemic stroke with tPA administration at OSH.  Presented initially with R sided weakness - increased from baseline.  Walks with a cane at home.  PMH includes: GERD, COPD, smoker, anxiety, and nerve pain  Neuro-  Full ROM to R thumb, but unable to freely move other digits on R hand.  R ankle painful/sensitive to touch - PTA norco q6h PRN.  CMS intact.      CV- VSS, BP within parameters and pulse in 70s.   Pulm- LS clear, diminished in bases.  GI- Constipation at baseline, receives stool softeners.  Tolerating regular diet.       - Voiding spontaneously in adequate amounts.     Skin- Intact, x1 PIV to LUE.  Gtts- IVF at 100/hr.     See flow sheets for further interventions and assessments.  P: Continue to monitor pt closely, Notify MD of changes/concerns.

## 2017-01-05 NOTE — CONSULTS
Social Work was consulted to assess needs of patient following stroke. Chart was reviewed and discussed with interdisciplinary team. Social work needs are not identified at this time and RNCC will continue to follow. Please re-consult social work if additional needs are identified.  Pt to d/c to home today.    JOHNNY Rvier, [facility]  ICU   Pager [phone]

## 2017-01-05 NOTE — PROGRESS NOTES
Care Coordinator Progress Note     Admission Date/Time:  1/4/2017  Attending MD:  Cali Álvarez MD     Data  Chart reviewed, discussed with interdisciplinary team.   Patient was admitted for: Cerebrovascular accident (CVA), unspecified mechanism (H).    Concerns with insurance coverage for discharge needs: None.  Current Living Situation: Patient lives with spouse.  Support System: Supportive and Involved  Services Involved: None  Transportation: Family or Friend will provide  Barriers to Discharge: None- pt will be d/c to home today.      Assessment  Pt came with Right hand weakness.  Pt is s/p tPA.  Nurse Care Coordination was consulted to assess needs of patient following stroke. Chart was reviewed and discussed with interdisciplinary team. Nurse care coordination needs are not identified at this time.  Pt has been seen by PT, OT and PM&R and home with out pt OT is rec. at this time.  Per chart review, pt has good family support. CC will cont to follow for any CC assistance need.     Plan  Anticipated Discharge Date:  Today, 1/5/17  Anticipated Discharge Plan:  Home with family and out pt OT.      Carmina Maher RN, BSN  4A and 4E/ ICU  Care Coordinator  Phone: [phone]  Pager: [phone]

## 2017-01-05 NOTE — PLAN OF CARE
Problem: Goal Outcome Summary  Goal: Goal Outcome Summary  OT/4A: Pt SBA ambulation, LB dressing, and seated ADLs. Pt educated on R hand exercises. Pt educated on EC/WS and home safety. Pt limited by R hand deficits     REC: Home with assist and OP hand therapy for R hand function

## 2017-01-05 NOTE — CONSULTS
Physical Medicine and Rehabilitation Consultation note    Patient Name: Verena Velasquez   YOB: 1960  MRN: [id number]     Age / Sex: 56 year old female    Reason for Consult: eval for rehab admission  Consult placed by: Dr. Bernard      ASSESSMENT/PLAN:   57 yo Right handed  female w/ pmh HTN, chronic back pain, CRPS of R ankle, anxiety, GERD, and tobacco use d/o, with L precentral gyrus ischemic stroke, s/p tPA. She presents with limited function of right hand- with absent movement in finger flexion/extension, with impaired  ADLs. Verena does have family support and they are able to provide assistance at home. She is not far from her baseline and does have insight into her deficits. Recommend discharge to home with outpatient occupational therapy to focus on right hand function, ADLs, and neuro-reeducation.     Thank you for the consult. Please feel free to call for any questions/concerns.    Case discussed with , Staff PM&R physician      HISTORY OF PRESENTING ILLNESS:  This is a 56 year old female with a hx of HTN, chronic back pain, CRPS of right ankle, anxiety, GERD, chronic smoker, who presented to OSH on 01/04/16 with right hand weakness and incoordination. NIHSS 2. CTA Head/Neck showed chronic small vessel disease but  was negative for dissection or stenosis. Patient was given tPA, as she was within in the window. She was transferred to [facility].She noted improved movement in her right thumb after tPA. NIHSS of 1 for RUE dysmetria on arrival.  MRI Head revealed acute to subacute infarct in L precentral gyrus. Cardiac workup (Echo) revealed patent PFO, no atrial thrombus, LV EF 55-60%. A1c 5.1. Elevated cholesterol 227 and . Patient was started on lipitor. She continued to be hemodynamically stable.       Prior Level of Function:   Modified Independent for ADLs and  ambulation with SEC. Independent with IADLs. 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    Current Function:  Patient was evaluated by therapy teams during his acute hospitalization, and function was noted:   OT:SBA ambulation, LB dressing, and seated ADLs  SLP: Regular diet with thin liquids      REVIEW OF SYSTEMS:  Gen: feeling well, no fevers, no chills  HEENT: no congestion, no visual loss, no hearing loss  Resp: no shortness of breath, no cough  CVS: no chest pains, no palpitations  GI: no abd pain, no n/v, no diarrhea, + constipation due to chronic opoid use  : no dysuria, voiding normally  Cognition: mild memory difficulties- baseline, no problem solving difficulties, no speech or language difficulties, no sensory abnormalities  Mobility: R hand weakness, normal balance  Pain: chronic low back pain/hip pain, and pain at right ankle    ALLERGIES:  Allergies   Allergen Reactions     Gabapentin      Lyrica [Pregabalin]      Morphine      Penicillins      Tramadol        PAST MEDICAL HISTORY:  Past Medical History   Diagnosis Date     Hypertension      Gastro-oesophageal reflux disease      COPD (chronic obstructive pulmonary disease) (H)      Chronic pain        FAMILY HISTORY:  No family history on file.   No significant FH    SOCIAL HISTORY:  Occupation: retired    Substances:   + smoking- 1 pack every 3 days for decades, denies alcohol use, or drug use    Home: Lives with  and granddaughter in [place], [place] in two story home- but lives on the first floor. No steps to enter or steps within the home    Assistance available: - who is retired, and granddaughter      PHYSICAL EXAM:  Vitals: B/P: 104/60, T: 98, P: Data Unavailable, R: 23  Gen: well appearing, no acute distress  HEENT: PERRLA, EOMI, normal conjunctivae, moist mucosae, normal speech  Resp: Non labored breathing  CVS: RRR, no murmurs rubs gallops  GI: S NT N  Neuro: AOx3. CN intact. Speech is clear and fluent. Mildly slowed cognitive processing speed. Recall 3/3 but only after cues. Able to do serial 7s but  needing extra time.  Strength testing reveals 5/5 strength throughout LUE/LLE. Exam is significant for RUE weakness. 4+/5 in SA, EF, EE. WF/WE 4-/5. Right thumb flexion, opposition, abduction, adduction intact. No volitional movement in digits 2-5  flexor or extensors. Unable to grasp hand. RLE reveal HF 5/5, KE 5/5, DF/PF 1/5- limited testing due to pain.  Sensation intact to light touch in LUE/LLE and RUE/RLE, except for  dysethesia to touch around R lateral malleous- chronic.   Coordination: FTN intact on L. Unable to test on R.   Balance: good sitting balance.  Ext: extremity pulses 2+, skin warm and well-perfused, no rashes or wounds noted        Aspen Lawler MD  PGY-2 Resident  Physical Medicine & Rehabilitation  Pager [phone]      Attending's note   Thank you for allowing us to participate in the care of this patient.   We were asked to see this patient by Dr Bernard to evaluate rehabilitation needs.   Patient has been seen, examined and evaluated by me independently. I reviewed today's vitals signs, lab values, imaging, medications, and current issues including medical, functional and social history significant to this admission.      I agree with the above note which reflects my direct input and interpretation of progress.   Patient seen and examined   Agree with the note above      My floor time today for this patient;  45minutes, more than 50% of which was spent in coordination of care and counseling.     Diane Diggs MD

## 2017-01-05 NOTE — PLAN OF CARE
Problem: Goal Outcome Summary  Goal: Goal Outcome Summary  Outcome: Adequate for Discharge Date Met:  01/05/17  Patient is A&Ox4, able to make needs known. Deficit on L hand. Unable to fully grasp with 2-5 fingers. Thumb has full range of motion. Full left hand has decreased sensation to hot and cold sensation. PERRL.  LUE and BLE baseline.  HR stable SR 60-70s. -120s/60-70s.   Lungs clear/dimnished. RA with saturations 95-98%. Afebrile. Has baseline constipation, BS active, Regular diet with good appetite,modified utensils for L hand,no issues noted with eating or swallowing. Commode for , adequate UOP.  Pain in back, described as aching. Norco given with good relief. Stroke education and Lipitor medication education given.All questions answered at this time. Discharge instructions given. No further questions at this time.  Will be discharged in wheelchair to home by Family in private vehicle.

## 2017-01-05 NOTE — PROGRESS NOTES
[facility], [place]   Neurology Daily Note    Verena Velasquez  [id number]  01/05/2017    Subjective Data:  Does not have any increased pain or discomfort from her usual chronic pain. No shortness of breath, chest pain, confusion, or headache. No increased numbness, tingling or weakness of the affected right hand. Question about right hand return of function.    Objective Data:   /90 mmHg  Temp(Src) 97.8  F (36.6  C) (Oral)  Resp 16  Wt 108.1 kg (238 lb 5.1 oz)  SpO2 96%    Neurologic:  - Mental Status: Alert, oriented x3, mentation and cognition intact.  - Cranial Nerves: CN II-XII intact: PERRL, EOMI, no nystagmus. Facial sensation intact and symmetric. No facial muscle paralysis or weakness. Hearing intact bilaterally. Speech coordination intact, without deficit. Uvula, tongue midline without deviation. Head rotation 5/5 bilaterally, shoulder elevation 5/5 bilaterally.  - Sensation: Pinprick and vibration reduced in right lower extremity up to high ankle (L4), pinprick and vibration reduced in right upper hand to wrist (C6/C8). Left upper and lower pinprick and vibration intact.   - Motor Exam: Right  strength 1/5, right wrist flexion 4/5, right wrist extension 3/5. Right elbow flexion/extension 5/5. Left elbow flexion/extension, wrist flexion/extension,  strength 5/5.  - Reflexes: Bilateral bicep, brachioradialis, patellar, and ankle reflexes 2+ and symmetric.  - Coordination: Left finger-nose intact, right finger-nose decreased. Left handed opposition and rapid movement intact, right handed opposition decreased to weakly hallux-index only; rapid right oppositional movements decreased to thumb only, slower compared to left.  Constitutional: Pleasant, talkative in no apparent distress.  Neck: Supple, non-tender, no lymphadenopathy.  Cardiovascular: Regular rate and rhythm. No murmur, gallop, or rub.  Respiratory: Lungs clear to auscultation bilaterally.  Abdominal:  Soft, non-tender, non-distended.  Extremities:   Skin:     Labs:  Recent Labs   Lab Test  01/05/17   0538  01/04/17   0413  08/25/16   1647 06/26/13   HGB   --   11.2*  12.4   --    WBC   --   8.8  9.4   --    PLT  277  306  318   --    NA   --   141  142   --    POTASSIUM   --   3.4  4.7  3.8   CR   --   0.92  0.81   --    BUN   --   10  7   --    GLC   --   99  85   --        Imaging:  MRI head (1/4/16): Left precentral gyrus infarction. Chronic small vessel ischemic changes.    ECHO (1/4/16): Ejection fraction 55-60%, patent foramen ovale, evidence of aortic atherosclerosis without stenosis.    Bilateral Lower Extremity US: pending, scheduled 1/5/16.    Assessment and Plan:  Cerebral infarction, middle cerebral artery distribution: Right hand 2-5 digit weakness. No apparent sensory deficiency. Since onset of symptoms, thumb motion and strength returned. No changes in hand mobility or strength since admission.  -Counseling of smoking cessation, Nicotine patch 14mg  -Atorvastatin 40mg PO q24h  -Consult to PT/PM&R  -OT outpatient  -ASA 81mg PO q24h  -Zio patch heart monitoring    FEN: Electrolytes replaced PRN  Prophylaxis: PCS, ASA/heparin contraindicated per post-tPA  Code Status: Full code    Dispo: Pending discharge home with family, still requiring BLE US, PM&R evaluation.    This patient was seen and discussed with attending neurologist, Dr. Álvarez.    Note prepared by:  Eliseo WAN  01/05/2017

## 2017-01-05 NOTE — PLAN OF CARE
Problem: Goal Outcome Summary  Goal: Goal Outcome Summary  Outcome: Improving  Events: MRI showed patent PFO- see education note copied/pasted below for further details.     Assessment: A&O all day except for quick run to MRI when patient had 10mg versed- she was slower to remember where she was (which hospital)- intermittent confusion- since resolved. Does have PTA 5mg versed po dose for PTA anxiety FYI. No other acute neurological deficits. R hand still proving to be weak- thumb has full ROM but that is all. Color, pulses, sensation intact everywhere else. All other systems intact at baseline status.     Activity: remains bedrest for 24 hours post TPA. Up to commode at times refusing to use bedpan- MD aware/cautioned against but pt insistent. Requires SBA- steady on feet despite baseline neuromuscular PTA diagnosis. R lateral malleolus extremely sensitive to touch- avoid if possible.     Plan: continue to monitor, discuss discharge plans. Continue to educate. neuros QH. Pt will report new deficits/strengths to team as they arise. Keep up on chronic pain management plan- back pain at baseline on disability d/t this. Pt will quit smoking and reports a strong desire to do so.

## 2017-01-06 NOTE — PLAN OF CARE
Problem: Goal Outcome Summary  Goal: Goal Outcome Summary  Occupational Therapy Discharge Summary    Reason for therapy discharge:    Discharged to home with outpatient therapy.    Progress towards therapy goal(s). See goals on Care Plan in [facility] electronic health record for goal details.  Goals partially met.  Barriers to achieving goals:   discharge from facility.    Therapy recommendation(s):    Continued therapy is recommended.  Rationale/Recommendations:  OP therapy to increase ADL I and tolerance.

## 2017-01-09 ENCOUNTER — CARE COORDINATION (OUTPATIENT)
Dept: NEUROLOGY | Facility: CLINIC | Age: 57
End: 2017-01-09

## 2017-01-11 NOTE — PROGRESS NOTES
"Stroke Center Discharge Coordination Note     Responsible Attending physician: Dr. Álvarez     Operation performed: None     Date of Discharge: 1/5/17     Discharge to: Home    Current Contact number: [phone]    Current Status:                  New s/s stroke; Trouble w/speech,thinking,processing:  No                               Incision site:  No                               Diabetic:  No                               Pain Management:  No    Receiving Therapy;Where:  [facility] health will [facility] on 1/19 at [facility]    Falls:  No    Driving;Working:  \"Tried on Sunday - kind of difficult shifting\"; Not working - \"on disability for other issues.\"                 ADL's:  Independent                                General: Feeling tired.     Follow up plan: Appointment with primary care provider within 7 days - has appointment today (1/11/17).  Appointment with stroke provider, next available per Dr. Bernard. Message sent to scheduling to contact patient for appointment. [employer] monitor placed. She has been in contact with them and states they will call when she is to remove and mail back.     Questions: Not at this time. Patient has my contact information and was encouraged to call.     What new medications did you start in the hospital and how are you taking those? (compare this to AVS to confirm patient taking correctly) Atorvastatin and 325 mg aspirin prescribed. PTA medications without change. Taking medications as prescribed with the exception of holding aspirin currently due to migraine injections.    Current Outpatient Prescriptions   Medication Sig Dispense Refill     atorvastatin (LIPITOR) 40 MG tablet Take 1 tablet (40 mg) by mouth daily 30 tablet 3     nicotine polacrilex (NICORETTE) 2 MG gum Place 1 each (2 mg) inside cheek every hour as needed for smoking cessation 20 each 0     aspirin 325 MG EC tablet Take 1 tablet (325 mg) by mouth daily 60 tablet 1     diazepam (VALIUM) 5 MG tablet Take 5 mg by " mouth every 6 hours as needed       escitalopram (LEXAPRO) 10 MG tablet Take 20 mg by mouth daily       nitroglycerin (NITROSTAT) 0.4 MG sublingual tablet        traZODone (DESYREL) 50 MG tablet Take 125 mg by mouth At Bedtime       albuterol (2.5 MG/3ML) 0.083% nebulizer solution Take 1 vial (2.5 mg) by nebulization every 4 hours as needed for shortness of breath / dyspnea (wheezing/cough) 1 Box 0     fentaNYL (DURAGESIC) 25 mcg/hr patch 72 hr Place 1 patch onto the skin every 72 hours       sucralfate (CARAFATE) 1 GM/10ML suspension Take 10 mLs (1 g) by mouth 4 times daily 420 mL 1     fluticasone (FLOVENT HFA) 110 MCG/ACT inhaler Inhale 2 puffs into the lungs 2 times daily       Esomeprazole Magnesium (NEXIUM PO) Take 40 mg by mouth 2 times daily        TOPIRAMATE PO Take 300 mg by mouth At Bedtime        TOPIRAMATE PO Take 200 mg by mouth every morning        Cyclobenzaprine HCl (FLEXERIL PO) Take 5 mg by mouth 3 times daily Pt reports she takes 3 tablets at bedtime       DiphenhydrAMINE HCl (BENADRYL PO) Take 75 mg by mouth       HYDROcodone-acetaminophen (NORCO) 5-325 MG per tablet Take 1 tablet by mouth every 6 hours as needed for moderate to severe pain       ipratropium - albuterol 0.5 mg/2.5 mg/3 mL (DUONEB) 0.5-2.5 (3) MG/3ML nebulization Inhale 1 vial (3 mLs) into the lungs every 6 hours as needed for shortness of breath / dyspnea or wheezing 150 mL 0       Connect to [place] [employer] (Free service that connects stroke survivors with resources)? Declined

## 2017-01-16 ENCOUNTER — PRE VISIT (OUTPATIENT)
Dept: NEUROLOGY | Facility: CLINIC | Age: 57
End: 2017-01-16

## 2017-01-16 NOTE — TELEPHONE ENCOUNTER
1.  Date/reason for appt:1/24/17, Stroke  2.  Referring provider:Self  3.  Call to patient (Yes / No - short description): No, records are in epic.   4.  Previous care at / records requested from:    ED- progress notes and imaging are in epic.

## 2017-02-02 ENCOUNTER — HOSPITAL ENCOUNTER (OUTPATIENT)
Dept: OCCUPATIONAL THERAPY | Facility: CLINIC | Age: 57
Setting detail: THERAPIES SERIES
End: 2017-02-02
Payer: MEDICARE

## 2017-02-02 PROCEDURE — 40000839 ZZH STATISTIC HAND THERAPY VISIT: Performed by: OCCUPATIONAL THERAPIST

## 2017-02-02 PROCEDURE — G8987 SELF CARE CURRENT STATUS: HCPCS | Mod: GO,CL | Performed by: OCCUPATIONAL THERAPIST

## 2017-02-02 PROCEDURE — G8988 SELF CARE GOAL STATUS: HCPCS | Mod: GO,CI | Performed by: OCCUPATIONAL THERAPIST

## 2017-02-02 PROCEDURE — 97110 THERAPEUTIC EXERCISES: CPT | Mod: GO | Performed by: OCCUPATIONAL THERAPIST

## 2017-02-02 PROCEDURE — 97167 OT EVAL HIGH COMPLEX 60 MIN: CPT | Mod: GO | Performed by: OCCUPATIONAL THERAPIST

## 2017-02-02 NOTE — PROGRESS NOTES
Occupational Therapy Evaluation     02/02/17 1333   Quick Adds   Quick Adds Certification;Fall Risk Screen   Therapy Certification   Certification date from 02/02/17   Certification date to 05/02/17   Medical Diagnosis CVA, unspecified mechanism I63.9   Certification I certify the need for these services furnished under this plan of treatment and while under my care.  (Physician co-signature of this document indicates review and certification of the therapy plan).   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  She continues to have decreased right arm and hand; sensation at tips of fingers, loss of strength, decreased coordination /motor skills, UE/hand pain, decreased cogntive processing/memory .  These deficts are effecting her ability to preform self care, home mgt., kaitlin and social activities.  She enjoys fishing, camping and reading.  She cares and has custody per her report of her teenaged granddaughter.   Previous treatment or current condition none   Past medical history chronic pain and migraines   How/Where did it occur From insidious onset   Onset date of current episode/exacerbation 01/04/17   Chronicity New   Hand Dominance Right   Affected side Right   Functional limitations perform activities of daily living;perform desired leisure / sports activities   Reported Symptoms Pain;Loss of Motion/Stiffness;Edema;Loss of strength   QuickDASH  "[Functional Disability Questionnaire; 0-100 (0=no dysfunction; 100=dysfunction)] (39/80 UEFI)   Important Activities writing (painful and decreased dexterity reported for writing), reading, fishing , camping and care of others   Patient role/Employment history Disabled   Patient/Family goals statement \" able to use hand more and use my fishing pole\"   Fall Risk Screen   Fall screen completed by OT   Have you fallen 2 or more times in the last year? Yes   Have you fallen and had an injury in the past year? No   Comments Patient uses a single point cane    Pain   Pain Primary Pain Report   Primary Pain Report   Location right arm   Pain Quality Aching   Frequency Constant   Scale 4/10   Pain Is Relieved By Nothing   Edema   Overall  - Right Moderate  (hand, wrist and distal forearm)   ROM   ROM AROM   AROM   Comments right UE and hand WNL all planes movements.   Strength   Strength Strength    Avg - Left 45#    Avg - Right 8# with decrease strength each trial of 3, increased pain with grasp  (increased pain in right shoulder/arm)   Lateral Pinch - Left 15#   Lateral Pinch - Right not tested due to pain   3 Point Pinch - Left 12#   3 Point Pinch - Right not tested due to pain   Education Assessment   Preferred Learning Style Listening;Demonstration;Pictures/video   Barriers to Learning Cognitive   Therapy Interventions   Planned Therapy Interventions Ultrasound;Strengthening;ROM;Coordination;Manual Therapy;Self Care/Home Management;Desensitization;Home Program;Adaptive Equipment Training   Therapy plan comments Patient additionally is having memory loss post CVA, per her report.  OT to complete cognitve assessements as deemed appropriate.   Clinical Impression   Criteria for Skilled Therapeutic Interventions Met yes;treatment indicated   OT Diagnosis Decreased functional use of the right hand/arm for daily tasks/activites.  In addition, to decreased cognitve skills per patient report s/p CVA.   Influenced by " the following impairments Pain;Edema;Decreased range of motion;Decreased strength;Impaired sensation  (decreased coordination and motor skills, right arm/hand)   Assessment of Occupational Performance 5 or more Performance Deficits   Identified Performance Deficits dressing, grooming, hygiene, home management, driving, care of others, social and leisure participation   Clinical Decision Making (Complexity) High complexity   Therapy Frequency 2x weeks   Predicted Duration of Therapy Intervention (days/wks) 12 weeks   Risks and Benefits of Treatment have been explained. Yes   Patient, Family & other staff in agreement with plan of care Yes   Hand Goals   Hand Goals Dressing;Hygiene/Toileting;Household Chores;Sports/Recreation;Enter Other Activity Here   Dressing   Current Functional Task Dressing LB   Previous Performance Level Independent   Current Performance Level Moderate difficulty   Goal Target Task Don/Doff bra;Don/Doff socks   Goal Target Performance Level No difficulty   Due Date 03/02/17   Hygiene/Toileting   Current Functional Task Brushing/combing hair;Brushing teeth;Styling;Washing back;Wiping   Previous Performance Level Independent   Current Performance Level Severe difficulty   Goal Target Task Hold toothbrush and brush teeth;Hold brush/comb and brush/comb hair;Hold toilet paper and wipe;Hold clippers to clip nails;Reach around to wipe   Goal Target Performance Level Mild difficulty   Due Date 05/02/17   Household Chores   Current Functional Task Washing and drying dishes;Vacuuming;Sweeping;Gripping;Carrying   Previous Performance Level Independent   Current Performance Level Unable   Goal Target Task Hold dish rag and wash dishes;Reach shelves to put dishes away;Sweep floors;Hold spoon for stirring;Hold and lift pan;Hold and lift plate;Open a tight or new jar;Turn key in lock to open door; and turn to open a door   Goal Target Performance Level Mild difficulty   Due Date 05/02/17    Sports/Recreation   Current Functional Task Holding;Gripping   Previous Performance Level Independent   Curent Performance Level Unable   Goal Target Task (fishing sen)   Goal Target Performance Level No difficulty   Due Date 05/02/17   Enter Other Activity Here   Current Functional Task memory   Previous Performance Level The patient will state and demonstrate up to 5 memory strategies to use with recall skill strengthengin with being able to recall 2 sets of 10 words, for improved cogntive skill improvement.   Due Date 05/02/17   Total Evaluation Time   Total Evaluation Time (Minutes) 30       Thank you for referring Verena  To OT services to assist with right hand functional stroke rehab and cognitive skills post CVA, improved functional independence with BADL/IADLs    If you have any questions or concerns, please contact me at [phone].    Ju Harrison MA, OTR/L  [facility]

## 2017-02-02 NOTE — PROGRESS NOTES
[facility]      OUTPATIENT OCCUPATIONAL THERAPY HAND EVALUATION  PLAN OF TREATMENT FOR OUTPATIENT REHABILITATION  (COMPLETE FOR INITIAL CLAIMS ONLY)  Patient's Last Name, First Name, M.I.  YOB: 1960  RonVerena  M                        Provider s Name: [facility] Medical Record No.  [id number]     Onset Date: 01/04/17    Start of Care Date: 02/02/17   Type:     ___PT  _X_OT   ___SLP    Medical Diagnosis: CVA, unspecified mechanism I63.9   Occupational Therapy Diagnosis:  Decreased functional use of the right hand/arm for daily tasks/activites.  In addition, to decreased cognitve skills per patient report s/p CVA.    Visits from SOC: 1      _________________________________________________________________________________  Plan of Treatment/Functional Goals:  Planned Therapy Interventions:  Ultrasound, Strengthening, ROM, Coordination, Manual Therapy, Self Care/Home Management, Desensitization, Home Program, Adaptive Equipment Training     Goals            1. Goal Target Task: Don/Doff bra, Don/Doff socks       Goal Target Performance Level: No difficulty       Due Date: 03/02/17     2. Goal Target Task: Hold toothbrush and brush teeth, Hold brush/comb and brush/comb hair, Hold toilet paper and wipe, Hold clippers to clip nails, Reach around to wipe       Goal Target Performance Level: Mild difficulty       Due Date: 05/02/17     3. Goal Target Task: Hold dish rag and wash dishes, Reach shelves to put dishes away, Sweep floors, Hold spoon for stirring, Hold and lift pan, Hold and lift plate, Open a tight or new jar, Turn key in lock to open door,  and turn to open a door       Goal Target Performance Level: Mild difficulty       Due Date: 05/02/17             4. Goal Target Task:  (fishing sen)       Goal Target Performance Level: No difficulty       Due Date: 05/02/17                     5    Enter Other Activity Here-Due Date: 05/02/17       The patient will state and demonstrate 5 memory strategies utilized to recall 2 sets of 10 word list, in order to improve memory/recall skills needed for daily tasks/activities.  Due Date; 05/02/17              Treatment Frequency: Therapy Frequency: 2x weeks  Predicated Duration of Therapy Intervention:  Predicted Duration of Therapy Intervention (days/wks): 12 weeks    Ju Harrison, OT         I CERTIFY THE NEED FOR THESE SERVICES FURNISHED UNDER        THIS PLAN OF TREATMENT AND WHILE UNDER MY CARE     (Physician co-signature of this document indicates review and certification of the therapy plan).                Certification Period:  02/02/17 to 05/02/17            Referring Physician:  Dr Meek Bernard    Initial Assessment        See Epic Evaluation Start of Care Date: 02/02/17        Thank you for referring Verena To OT services to assist with post CVA rehab.    If you have any questions or concerns, please contact me at [phone].    Ju Harrison MA, OTR/L  [facility]

## 2017-02-02 NOTE — PROGRESS NOTES
Occupational Therapy Evaluation     02/02/17 1333   Quick Adds   Quick Adds Certification;Fall Risk Screen   Therapy Certification   Certification date from 02/02/17   Certification date to 05/02/17   Medical Diagnosis CVA, unspecified mechanism I63.9   Certification I certify the need for these services furnished under this plan of treatment and while under my care.  (Physician co-signature of this document indicates review and certification of the therapy plan).   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 for further assessment and was released from hospital.  She continues to have decreased right arm and hand; sensation at tips of fingers, loss of strength, decreased coordination /motor skills, UE/hand pain, decreased cogntive processing/memory .  These deficts are effecting her ability to preform self care, home mgt., kaitlin and social activities.  She enjoys fishing, camping and reading.  She cares and has custody per her report of her teenaged granddaughter.   Previous treatment or current condition none   Past medical history chronic pain and migraines   How/Where did it occur From insidious onset   Onset date of current episode/exacerbation 01/04/17   Chronicity New   Hand Dominance Right   Affected side Right   Functional limitations perform activities of daily living;perform desired leisure / sports activities   Reported Symptoms Pain;Loss of Motion/Stiffness;Edema;Loss of strength   QuickDASH  "[Functional Disability Questionnaire; 0-100 (0=no dysfunction; 100=dysfunction)] (39/80 UEFI)   Important Activities writing (painful and decreased dexterity reported for writing), reading, fishing , camping and care of others   Patient role/Employment history Disabled   Patient/Family goals statement \" able to use hand more and use my fishing pole\"   Fall Risk Screen   Fall screen completed by OT   Have you fallen 2 or more times in the last year? Yes   Have you fallen and had an injury in the past year? No   Comments Patient uses a single point cane    Pain   Pain Primary Pain Report   Primary Pain Report   Location right arm   Pain Quality Aching   Frequency Constant   Scale 4/10   Pain Is Relieved By Nothing   Edema   Overall  - Right Moderate  (hand, wrist and distal forearm)   ROM   ROM AROM   AROM   Comments right UE and hand WNL all planes movements.   Strength   Strength Strength    Avg - Left 45#    Avg - Right 8# with decrease strength each trial of 3, increased pain with grasp  (increased pain in right shoulder/arm)   Lateral Pinch - Left 15#   Lateral Pinch - Right not tested due to pain   3 Point Pinch - Left 12#   3 Point Pinch - Right not tested due to pain   Education Assessment   Preferred Learning Style Listening;Demonstration;Pictures/video   Barriers to Learning Cognitive   Therapy Interventions   Planned Therapy Interventions Ultrasound   Therapy plan comments Patient additionally is having memory loss post CVA, per her report.  OT to complete cognitve assessements as deemed appropriate.   Clinical Impression   Criteria for Skilled Therapeutic Interventions Met yes;treatment indicated   OT Diagnosis Decreased functional use of the right hand/arm for daily tasks/activites.  In addition, to decreased cognitve skills per patient report s/p CVA.   Influenced by the following impairments Pain;Edema;Decreased range of motion;Decreased strength;Impaired sensation  (decreased coordination and " motor skills, right arm/hand)   Assessment of Occupational Performance 5 or more Performance Deficits   Identified Performance Deficits dressing, grooming, hygiene, home management, driving, care of others, social and leisure participation   Clinical Decision Making (Complexity) High complexity   Therapy Frequency 2x weeks   Predicted Duration of Therapy Intervention (days/wks) 12 weeks   Risks and Benefits of Treatment have been explained. Yes   Patient, Family & other staff in agreement with plan of care Yes   Hand Goals   Hand Goals Dressing;Hygiene/Toileting;Household Chores;Sports/Recreation;Enter Other Activity Here   Dressing   Current Functional Task Dressing LB   Previous Performance Level Independent   Current Performance Level Moderate difficulty   Goal Target Task Don/Doff bra;Don/Doff socks   Goal Target Performance Level No difficulty   Due Date 03/02/17   Hygiene/Toileting   Current Functional Task Brushing/combing hair;Brushing teeth;Styling;Washing back;Wiping   Previous Performance Level Independent   Current Performance Level Severe difficulty   Goal Target Task Hold toothbrush and brush teeth;Hold brush/comb and brush/comb hair;Hold toilet paper and wipe;Hold clippers to clip nails;Reach around to wipe   Goal Target Performance Level Mild difficulty   Due Date 05/02/17   Household Chores   Current Functional Task Washing and drying dishes;Vacuuming;Sweeping;Gripping;Carrying   Previous Performance Level Independent   Current Performance Level Unable   Goal Target Task Hold dish rag and wash dishes;Reach shelves to put dishes away;Sweep floors;Hold spoon for stirring;Hold and lift pan;Hold and lift plate;Open a tight or new jar;Turn key in lock to open door; and turn to open a door   Goal Target Performance Level Mild difficulty   Due Date 05/02/17   Sports/Recreation   Current Functional Task Holding;Gripping   Previous Performance Level Independent   Curent Performance Level Unable   Goal  Target Task (fishing sen)   Goal Target Performance Level No difficulty   Due Date 05/02/17   Enter Other Activity Here   Current Functional Task memory   Previous Performance Level The patient will state and demonstrate up to 5 memory strategies to use with recall skill strengthengin with being able to recall 2 sets of 10 words, for improved cogntive skill improvement.   Due Date 05/02/17   Total Evaluation Time   Total Evaluation Time (Minutes) 30       Thank you for referring Verena To OT services to assist with s/p CVA rehab with right hand/arm and memory skills for daily tasks/activities.    If you have any questions or concerns, please contact me at [phone].    Ju Harrison MA, OTR/L  [facility]

## 2017-02-14 ENCOUNTER — HOSPITAL ENCOUNTER (OUTPATIENT)
Dept: OCCUPATIONAL THERAPY | Facility: CLINIC | Age: 57
Setting detail: THERAPIES SERIES
End: 2017-02-14
Attending: STUDENT IN AN ORGANIZED HEALTH CARE EDUCATION/TRAINING PROGRAM
Payer: MEDICARE

## 2017-02-14 PROCEDURE — 40000839 ZZH STATISTIC HAND THERAPY VISIT: Performed by: OCCUPATIONAL THERAPIST

## 2017-02-14 PROCEDURE — 97110 THERAPEUTIC EXERCISES: CPT | Mod: GO | Performed by: OCCUPATIONAL THERAPIST

## 2017-02-17 ENCOUNTER — HOSPITAL ENCOUNTER (OUTPATIENT)
Dept: OCCUPATIONAL THERAPY | Facility: CLINIC | Age: 57
Setting detail: THERAPIES SERIES
End: 2017-02-17
Attending: STUDENT IN AN ORGANIZED HEALTH CARE EDUCATION/TRAINING PROGRAM
Payer: MEDICARE

## 2017-02-17 PROCEDURE — 40000839 ZZH STATISTIC HAND THERAPY VISIT: Performed by: OCCUPATIONAL THERAPIST

## 2017-02-17 PROCEDURE — 97110 THERAPEUTIC EXERCISES: CPT | Mod: GO | Performed by: OCCUPATIONAL THERAPIST

## 2017-03-03 ENCOUNTER — HOSPITAL ENCOUNTER (OUTPATIENT)
Dept: OCCUPATIONAL THERAPY | Facility: CLINIC | Age: 57
Setting detail: THERAPIES SERIES
End: 2017-03-03
Attending: STUDENT IN AN ORGANIZED HEALTH CARE EDUCATION/TRAINING PROGRAM
Payer: MEDICARE

## 2017-03-03 PROCEDURE — 40000839 ZZH STATISTIC HAND THERAPY VISIT: Performed by: OCCUPATIONAL THERAPIST

## 2017-03-03 PROCEDURE — 97535 SELF CARE MNGMENT TRAINING: CPT | Mod: GO | Performed by: OCCUPATIONAL THERAPIST

## 2017-03-03 PROCEDURE — 97035 APP MDLTY 1+ULTRASOUND EA 15: CPT | Mod: GO | Performed by: OCCUPATIONAL THERAPIST

## 2017-03-22 ENCOUNTER — OFFICE VISIT (OUTPATIENT)
Dept: NEUROLOGY | Facility: CLINIC | Age: 57
End: 2017-03-22
Payer: MEDICARE

## 2017-03-22 VITALS
OXYGEN SATURATION: 98 % | WEIGHT: 250.7 LBS | BODY MASS INDEX: 41.77 KG/M2 | DIASTOLIC BLOOD PRESSURE: 81 MMHG | HEIGHT: 65 IN | HEART RATE: 96 BPM | SYSTOLIC BLOOD PRESSURE: 130 MMHG

## 2017-03-22 DIAGNOSIS — R25.1 TREMOR: ICD-10-CM

## 2017-03-22 DIAGNOSIS — I63.9 CEREBROVASCULAR ACCIDENT (CVA), UNSPECIFIED MECHANISM (H): Primary | ICD-10-CM

## 2017-03-22 PROCEDURE — 99214 OFFICE O/P EST MOD 30 MIN: CPT | Performed by: PSYCHIATRY & NEUROLOGY

## 2017-03-22 RX ORDER — TOPIRAMATE 50 MG/1
TABLET, FILM COATED ORAL 2 TIMES DAILY
COMMUNITY

## 2017-03-22 RX ORDER — BUTALBITAL, ACETAMINOPHEN AND CAFFEINE 50; 325; 40 MG/1; MG/1; MG/1
1 TABLET ORAL PRN
COMMUNITY
Start: 2017-02-01

## 2017-03-22 RX ORDER — ASPIRIN AND DIPYRIDAMOLE 25; 200 MG/1; MG/1
1 CAPSULE, EXTENDED RELEASE ORAL 2 TIMES DAILY
COMMUNITY
Start: 2017-03-21

## 2017-03-22 RX ORDER — HYDROCODONE BITARTRATE AND ACETAMINOPHEN 5; 325 MG/1; MG/1
1 TABLET ORAL PRN
COMMUNITY
Start: 2017-02-01

## 2017-03-22 ASSESSMENT — PAIN SCALES - GENERAL: PAINLEVEL: SEVERE PAIN (7)

## 2017-03-22 NOTE — MR AVS SNAPSHOT
After Visit Summary   3/22/2017    Verena Velasquez    MRN: [id number]           Patient Information     Date Of Birth          1960        Visit Information        Provider Department      3/22/2017 3:00 PM Satnam Otero MD [facility]        Today's Diagnoses     Cerebrovascular accident (CVA), unspecified mechanism (H)    -  1    Tremor          Care Instructions    Discuss switching to either low dose aspirin (81mg) or clopidogrel with Dr Webster as the Aggrenox may be worsening your chronic headaches  Ask Dr Rey about rechecking your thyroid funstions because of the tremor        Follow-ups after your visit        Follow-up notes from your care team     Discussed this visit Return if symptoms worsen or fail to improve.      Who to contact     If you have questions or need follow up information about today's clinic visit or your schedule please contact [facility] directly at [phone].  Normal or non-critical lab and imaging results will be communicated to you by MyChart, letter or phone within 4 business days after the clinic has received the results. If you do not hear from us within 7 days, please contact the clinic through [email or web address] or phone. If you have a critical or abnormal lab result, we will notify you by phone as soon as possible.  Submit refill requests through [employer] or call your pharmacy and they will forward the refill request to us. Please allow 3 business days for your refill to be completed.          Additional Information About Your Visit        MyChart Information     [employer] is an electronic gateway that provides easy, online access to your medical records. With [employer], you can request a clinic appointment, read your test results, renew a prescription or communicate with your care team.     To sign up for [employer] visit the website at [email or web address]   You will be asked to enter the access code listed below, as well  "as some personal information. Please follow the directions to create your username and password.     Your access code is: [email or web address]  Expires: 2017  1:16 PM     Your access code will  in 90 days. If you need help or a new code, please contact your [facility] or call [phone] for assistance.        Care EveryWhere ID     This is your Care EveryWhere ID. This could be used by other organizations to access your [place] medical records  [id number]        Your Vitals Were     Pulse Height Pulse Oximetry BMI (Body Mass Index)          96 1.651 m (5' 5\") 98% 41.72 kg/m2         Blood Pressure from Last 3 Encounters:   17 130/81   17 105/61   17 145/84    Weight from Last 3 Encounters:   17 113.7 kg (250 lb 11.2 oz)   17 108.1 kg (238 lb 5.1 oz)   17 108.9 kg (240 lb)              Today, you had the following     No orders found for display       Primary Care Provider Office Phone # Fax #    Erlin B Ermaouni [phone]       [facility] [address] [facility] [address]        Thank you!     Thank you for choosing [facility]  for your care. Our goal is always to provide you with excellent care. Hearing back from our patients is one way we can continue to improve our services. Please take a few minutes to complete the written survey that you may receive in the mail after your visit with us. Thank you!             Your Updated Medication List - Protect others around you: Learn how to safely use, store and throw away your medicines at www.disposemymeds.org.          This list is accurate as of: 3/22/17  3:52 PM.  Always use your most recent med list.                   Brand Name Dispense Instructions for use    AGGRENOX  MG per 12 hr capsule   Generic drug:  aspirin-dipyridamole      Take 1 capsule by mouth 2 times daily       albuterol (2.5 MG/3ML) 0.083% neb solution     1 Box    Take 1 vial " (2.5 mg) by nebulization every 4 hours as needed for shortness of breath / dyspnea (wheezing/cough)       atorvastatin 40 MG tablet    LIPITOR    30 tablet    Take 1 tablet (40 mg) by mouth daily       BENADRYL PO      Take 75 mg by mouth       butalbital-acetaminophen-caffeine -40 MG per tablet    FIORICET/ESGIC     Take 1 tablet by mouth as needed       diazepam 5 MG tablet    VALIUM     Take 5 mg by mouth every 6 hours as needed       escitalopram 10 MG tablet    LEXAPRO     Take 20 mg by mouth daily       fentaNYL 25 mcg/hr 72 hr patch    DURAGESIC     Place 1 patch onto the skin every 72 hours       FLEXERIL PO      Take 5 mg by mouth 3 times daily Pt reports she takes 3 tablets at bedtime       fluticasone 110 MCG/ACT Inhaler    FLOVENT HFA     Inhale 2 puffs into the lungs 2 times daily       * HYDROcodone-acetaminophen 5-325 MG per tablet    NORCO     Take 1 tablet by mouth every 6 hours as needed for moderate to severe pain       * HYDROcodone-acetaminophen 5-325 MG per tablet    NORCO     Take 1 tablet by mouth as needed       ipratropium - albuterol 0.5 mg/2.5 mg/3 mL 0.5-2.5 (3) MG/3ML neb solution    DUONEB    150 mL    Inhale 1 vial (3 mLs) into the lungs every 6 hours as needed for shortness of breath / dyspnea or wheezing       NEXIUM PO      Take 40 mg by mouth 2 times daily       nicotine polacrilex 2 MG gum    NICORETTE    20 each    Place 1 each (2 mg) inside cheek every hour as needed for smoking cessation       NITROSTAT 0.4 MG sublingual tablet   Generic drug:  nitroglycerin      Reported on 3/22/2017       topiramate 50 MG tablet    TOPAMAX     Take by mouth 2 times daily 2 every am and 3 every pm       traZODone 50 MG tablet    DESYREL     Take 50 mg by mouth At Bedtime       * Notice:  This list has 2 medication(s) that are the same as other medications prescribed for you. Read the directions carefully, and ask your doctor or other care provider to review them with you.

## 2017-03-22 NOTE — NURSING NOTE
"Verena Velasquez's goals for this visit include: consult  She requests these members of her care team be copied on today's visit information:     PCP: Erlin Webster    Referring Provider:  Referred Self, MD  No address on file    Chief Complaint   Patient presents with     Consult       Initial /81 (BP Location: Left arm, Cuff Size: Adult Large)  Pulse 96  Ht 1.651 m (5' 5\")  Wt 113.7 kg (250 lb 11.2 oz)  SpO2 98%  BMI 41.72 kg/m2 Estimated body mass index is 41.72 kg/(m^2) as calculated from the following:    Height as of this encounter: 1.651 m (5' 5\").    Weight as of this encounter: 113.7 kg (250 lb 11.2 oz).  Medication Reconciliation: complete    Do you need any medication refills at today's visit? n  "

## 2017-03-22 NOTE — PROGRESS NOTES
Dear Dr Webster:      I saw Verena Velasquez for neurologic followup on 03/22/2017.  She is a 56-year-old female following up today because of a stroke that she suffered in 01/2017.      The patient was admitted to the [facility] [place] [facility] Stroke Service on 01/14/2017.  Earlier that day she developed acute right upper extremity weakness and incoordination.  She received TPA at an outside hospital and was transferred to the Stroke Service.  She did undergo observation and further evaluation.      She had a brain MRI scan that revealed a small area of acute to subacute infarction in the left precentral gyrus as well as mild chronic small vessel changes.  She had CT angiographic studies that revealed minimal atherosclerotic disease involving the carotid bifurcations and brachiocephalic vessels but no stenosis.  A cardiac echo with bubble study was normal aside from changes suggesting a patent foramen ovale with agitated saline bubble study.  Lower extremity ultrasound was negative for DVT.  She had cardiac monitoring for more than 24 hours in the hospital.  No atrial fibrillation was noted.  She has subsequently had a 14 day ZIO Patch monitor done and this study was negative for atrial fibrillation as well.      Laboratory testing included an essentially normal CBC, platelet count, PT and INR.  Her hemoglobin A1c was normal at 5.1.  Her LDL cholesterol was 156.      She was discharged on aspirin and atorvastatin.  She followed up with you and you have switched her to Aggrenox from regular aspirin as she tends to have a sensitive stomach.  Coincidently with going on the Aggrenox her chronic headaches have worsened.      The patient has been receiving therapy and her right hand function is definitely improved.  She has had no new acute neurologic events.  She has noted a tremor since the hospitalization affecting both hands.  I do note she has had some laboratory work done through your clinic since  discharged.  Her renal function is essentially normal.  She has an elevated alkaline phosphatase that she has had for a few years, but normal transaminases and bilirubin.  She did have thyroid functions checked in January which were normal.      PAST MEDICAL HISTORY:  Also notable for reflex sympathetic dystrophy affecting the right foot related to a work injury 8 years ago.  She has chronic back pain.  She has migraine.  She follows with Dr. Nicholas Esquivel at the [facility].      CURRENT MEDICATIONS:   1.  Aggrenox.     2.  Topamax.   3.  Fioricet p.r.n.   4.  Hydrocodone p.r.n.   5.  Lipitor 40 mg.   6.  Nicorette gum.   7.  Lexapro 20 mg   8.  Albuterol p.r.n.   9.  Duragesic patch.   10.  Flovent.     11.  Nexium.     12.  Flexeril.     13.  Benadryl.     14.  Norco.     15.  DuoNeb.   16.  Nitrostat p.r.n.   17.  Trazodone.      ALLERGIES:  Listed include gabapentin, Lyrica, morphine, penicillin, tramadol.      SOCIAL HISTORY:  She continues to smoke but she is trying very hard to quit.  She does not use alcohol.      PHYSICAL EXAMINATION:   GENERAL:  Reveals a patient who is alert and cooperative.   VITAL SIGNS:  Heart rate 96.  Blood pressure 130/81.   CRANIAL NERVES:  Pupils are equal, round and react well to light.  Visual fields are intact.  Speech is clear.  Facial movements are normal and otherwise cranial nerves II-XII are intact.   MOTOR:  Reveals a tendency to break when I test the left shoulder which likely relates to the shoulder itself.  She actually has good strength in the right hand and fairly good dexterity.  She does not have a pronator drift.  She will not allow me to examine her right foot because of the RSD.   SENSORY:  There is no evidence of cortical sensory loss.  The patient does have a mild to moderate postural tremor.  There is no rest tremor.  Her upper extremity tone is normal.   GAIT:  Antalgic favoring the right foot.   REFLEXES:  2-3+ in the right arm 2+ in the left  arm, absent at the knees and unobtainable at the left ankle.  She would not let me check the right ankle.  Left plantar response is flexor, the right was not tested.      IMPRESSION:   1.  Left precentral gyrus stroke in 2017 with good recovery.   2.  Worsening headaches, possibly related to Aggrenox therapy.   3.  Tremor, which could represent an essential tremor and/ or the effects of medications.      PLAN:  I did suggest she discuss switching from Aggrenox to either low dose (81 mg) aspirin or clopidogrel (Plavix).  It is quite possible the Aggrenox is exacerbating her headaches.      She should continue on some form of antiplatelet agent as well as a statin agent, which she is on now.      I discussed the PFO.  It could well be an incidental finding and current consensus is to not perform a closure, although this might be reconsidered if the patient continues to have cryptogenic strokes in the future.      Regarding her tremor it could be a medication effect, but I did tell her that it would be worthwhile to recheck her thyroid functions and she will discuss this with you.      I encouraged her to stop smoking and she acknowledges that is important, although it has been difficult for her.      Neurologic followup with me will be as needed in the future.         MARYLIN SUMMERS MD             D: 2017 16:09   T: 2017 16:33   MT: [id number]      Name:     KOTA MUNOZ   MRN:      [id number]        Account:      [id number]   :      1960           Visit Date:   2017      Document: [id number]       cc: Erlin Webster DO

## 2017-03-22 NOTE — PATIENT INSTRUCTIONS
Discuss switching to either low dose aspirin (81mg) or clopidogrel with Dr Webster as the Aggrenox may be worsening your chronic headaches  Ask Dr Rey about rechecking your thyroid funstions because of the tremor

## 2017-03-27 NOTE — PROGRESS NOTES
2017      Erlin Webster,    [facility]    [address] [place] [address]      RE: Verena Velasquez   MRN: [id number]   : 1960      Dear Dr. Webster:      I saw your patient, Verena Velasquez for neurologic followup.  She is a 56-year-old female here for followup of a stroke that occurred in January of this year.      The patient was admitted to the Stroke Service at [facility] on 2017.  She acutely developed right hand weakness and incoordination.  It was felt that this likely represented a stroke.  She was administered TPA at an outside hospital and then transferred to [facility] Stroke Service for further observation and evaluation.      She did have a head MRI scan done that revealed an area of acute infarction involving the left precentral gyrus as well as chronic small vessel changes.  There was no evidence of intracranial hemorrhage.  She had CT angiographic studies that revealed no significant intracranial or neck stenosis, but some minimal atherosclerotic disease involving the carotid bifurcations.      A cardiac echo with bubble study was notable for changes suggesting a patent foramen ovale with agitated saline.  Otherwise, the echo was normal.  She had an ultrasound of the lower extremities done and there was no evidence of DVT.  Cardiac monitoring for 24 hours revealed no atrial fibrillation.      A 14 day ZIO Patch monitor was placed and it subsequent came back negative for atrial fibrillation as well.      Laboratory testing of note included an LDL cholesterol of 156.  Her hemoglobin A1c was normal at 5.1.  Her CBC and platelet count were essentially normal as were PTT and INR.  She was discharged with a diagnosis of acute ischemic stroke due to undetermined etiology.  The significance of the PFO is uncertain.  It was recommended she start atorvastatin and antiplatelet therapy.  She was discharged on  aspirin, but she followed up with you and you noted that she has always had an irritable stomach and so she was switched to Aggrenox from aspirin.  Since going on the Aggrenox, she has had worsening of her chronic headaches.      She did receive physical therapy and has had a significant improvement.  She was quite weak in her right hand at discharge on 2017, but her hand function is significantly improved.  She has had no further events.      She is trying to stop smoking but has been unable to do so so far.      She today also tells me she has developed a tremor in both hands.  I do note that some laboratory work has been checked through your clinic recently.  She has normal renal function.  Liver function studies      DICTATION ENDS HERE     PARTIAL DICTATION. SEE FULL NOTE 3/22/17        MARYLIN SUMMERS MD             D: 2017 15:59   T: 2017 09:57   MT: [id number]      Name:     KOTA MUNOZ   MRN:      [id number]        Account:      [id number]   :      1960           Service Date: 2017      Document: [id number]

## 2017-04-07 ENCOUNTER — CARE COORDINATION (OUTPATIENT)
Dept: NEUROLOGY | Facility: CLINIC | Age: 57
End: 2017-04-07

## 2017-04-07 NOTE — PROGRESS NOTES
Patient contacted to complete the mRS questionnaire. Patient's score was 1. Patient denies any concerns and will follow up as previously directed. Patient has contact information and was instructed to call with any new concerns.

## 2017-05-02 NOTE — ADDENDUM NOTE
Encounter addended by: Ju Harrison OT on: 5/2/2017 11:33 AM<BR>     Actions taken: Episode resolved, Sign clinical note

## 2017-05-02 NOTE — PROGRESS NOTES
Outpatient Occupational Therapy Discharge Note     Patient: Kota Munoz  : 1960  Insurance:   Payor/Plan Subscriber Name Rel Member # Group #   MEDICARE - MEDICARE KOTA MUNOZ  [id number]       ATTN CLAIMS, [address]   UCARE - UCARE CONNECT* KOTA MUNOZ  [id number]      [address]       Beginning/End Dates of Reporting Period:   to 2017  Patient seen for 4 OT sessions during POC    Referring Provider: Dr Meek Bernard    Therapy Diagnosis: right hand weakness, effecting her ability to grasp for daily tasks/activities    Client Self Report:   Patient has not contacted rehab to set further OT appointments.  OT to discharge at this time.    Objective Measurements:  Did not return for discharge assessement          Goals:     Patient did not return for assessement    Plan:  Discharge from therapy.        Reason for Discharge: Patient chooses to discontinue therapy.  Patient has failed to schedule further appointments.  Medicare G-code: Patient did not attend a final scheduled session prior to discharge. Unable to determine discharge functional status.      Discharge Plan: Patient to continue home program.      Thank you for referring [facility] OT services to assist with improve skills for daily tasks/activities.    If you have any questions or concerns, please contact me at [phone].    Ju Harrison MA, OTR/L  [facility]

## 2017-08-29 ENCOUNTER — HOSPITAL ENCOUNTER (EMERGENCY)
Facility: CLINIC | Age: 57
Discharge: HOME | End: 2017-08-29
Attending: PHYSICIAN ASSISTANT | Admitting: PHYSICIAN ASSISTANT
Payer: MEDICARE

## 2017-08-29 VITALS
SYSTOLIC BLOOD PRESSURE: 129 MMHG | OXYGEN SATURATION: 96 % | TEMPERATURE: 98.6 F | HEART RATE: 96 BPM | WEIGHT: 245 LBS | BODY MASS INDEX: 40.82 KG/M2 | HEIGHT: 65 IN | RESPIRATION RATE: 18 BRPM | DIASTOLIC BLOOD PRESSURE: 62 MMHG

## 2017-08-29 DIAGNOSIS — G43.009 MIGRAINE WITHOUT AURA AND WITHOUT STATUS MIGRAINOSUS, NOT INTRACTABLE: ICD-10-CM

## 2017-08-29 PROCEDURE — 99284 EMERGENCY DEPT VISIT MOD MDM: CPT | Mod: 25 | Performed by: PHYSICIAN ASSISTANT

## 2017-08-29 PROCEDURE — 25000128 H RX IP 250 OP 636: Performed by: PHYSICIAN ASSISTANT

## 2017-08-29 PROCEDURE — 96375 TX/PRO/DX INJ NEW DRUG ADDON: CPT | Performed by: PHYSICIAN ASSISTANT

## 2017-08-29 PROCEDURE — 96365 THER/PROPH/DIAG IV INF INIT: CPT | Performed by: PHYSICIAN ASSISTANT

## 2017-08-29 PROCEDURE — 96361 HYDRATE IV INFUSION ADD-ON: CPT | Performed by: PHYSICIAN ASSISTANT

## 2017-08-29 PROCEDURE — 99284 EMERGENCY DEPT VISIT MOD MDM: CPT | Mod: Z6 | Performed by: PHYSICIAN ASSISTANT

## 2017-08-29 RX ORDER — SODIUM CHLORIDE 9 MG/ML
1000 INJECTION, SOLUTION INTRAVENOUS CONTINUOUS
Status: DISCONTINUED | OUTPATIENT
Start: 2017-08-29 | End: 2017-08-29 | Stop reason: HOSPADM

## 2017-08-29 RX ORDER — KETOROLAC TROMETHAMINE 30 MG/ML
30 INJECTION, SOLUTION INTRAMUSCULAR; INTRAVENOUS ONCE
Status: COMPLETED | OUTPATIENT
Start: 2017-08-29 | End: 2017-08-29

## 2017-08-29 RX ORDER — DIPHENHYDRAMINE HYDROCHLORIDE 50 MG/ML
25 INJECTION INTRAMUSCULAR; INTRAVENOUS ONCE
Status: COMPLETED | OUTPATIENT
Start: 2017-08-29 | End: 2017-08-29

## 2017-08-29 RX ORDER — MAGNESIUM SULFATE 1 G/100ML
1 INJECTION INTRAVENOUS ONCE
Status: COMPLETED | OUTPATIENT
Start: 2017-08-29 | End: 2017-08-29

## 2017-08-29 RX ADMIN — MAGNESIUM SULFATE IN DEXTROSE 1 G: 10 INJECTION, SOLUTION INTRAVENOUS at 20:07

## 2017-08-29 RX ADMIN — KETOROLAC TROMETHAMINE 30 MG: 30 INJECTION, SOLUTION INTRAMUSCULAR at 19:58

## 2017-08-29 RX ADMIN — PROCHLORPERAZINE EDISYLATE 10 MG: 5 INJECTION INTRAMUSCULAR; INTRAVENOUS at 20:01

## 2017-08-29 RX ADMIN — SODIUM CHLORIDE 1000 ML: 9 INJECTION, SOLUTION INTRAVENOUS at 19:34

## 2017-08-29 RX ADMIN — DIPHENHYDRAMINE HYDROCHLORIDE 25 MG: 50 INJECTION, SOLUTION INTRAMUSCULAR; INTRAVENOUS at 19:55

## 2017-08-29 NOTE — ED AVS SNAPSHOT
[facility] Emergency Department    [address] [place] [address]    Phone:  [phone]    Fax:  [phone]                                       Verena Velasquez   MRN: [id number]    Department:  [facility] Emergency Department   Date of Visit:  8/29/2017           After Visit Summary Signature Page     I have received my discharge instructions, and my questions have been answered. I have discussed any challenges I see with this plan with the nurse or doctor.    ..........................................................................................................................................  Patient/Patient Representative Signature      ..........................................................................................................................................  Patient Representative Print Name and Relationship to Patient    ..................................................               ................................................  Date                                            Time    ..........................................................................................................................................  Reviewed by Signature/Title    ...................................................              ..............................................  Date                                                            Time

## 2017-08-29 NOTE — ED AVS SNAPSHOT
[facility] Emergency Department    [address] [place] [address]    Phone:  [phone]    Fax:  [phone]                                       Verena Velasquez   MRN: [id number]    Department:  [facility] Emergency Department   Date of Visit:  8/29/2017           Patient Information     Date Of Birth          1960        Your diagnoses for this visit were:     Migraine without aura and without status migrainosus, not intractable        You were seen by Abner Vaughn PA-C.      Follow-up Information     Follow up with [facility] Emergency Department.    Specialty:  EMERGENCY MEDICINE    Why:  As needed, If symptoms worsen    Contact information:    [address]   [place] [address] [phone]    Additional information:    From [address]: Exit at [address] on south side of [place]. Turn right on [address]. Turn left at stoplight on [address]. [facility] will be in view two blocks ahead        Discharge Instructions       1.  Please try and get into your Pain Clinic as soon as possible to get your next series of trigger point injections.  2.  Please return to the ED prior to then if your headache returns.  We could always consider Bupivacaine trigger point injections done here.    24 Hour Appointment Hotline       To make an appointment at any [facility] clinic, call [phone] ([phone]). If you don't have a family doctor or clinic, we will help you find one. [facility] clinics are conveniently located to serve the needs of you and your family.             Review of your medicines      Our records show that you are taking the medicines listed below. If these are incorrect, please call your family doctor or clinic.        Dose / Directions Last dose taken    AGGRENOX  MG per 12 hr capsule   Dose:  1 capsule   Generic drug:  aspirin-dipyridamole        Take 1 capsule by mouth 2 times daily   Refills:  0         albuterol (2.5 MG/3ML) 0.083% neb solution   Dose:  1 vial   Quantity:  1 Box        Take 1 vial (2.5 mg) by nebulization every 4 hours as needed for shortness of breath / dyspnea (wheezing/cough)   Refills:  0        atorvastatin 40 MG tablet   Commonly known as:  LIPITOR   Dose:  40 mg   Quantity:  30 tablet        Take 1 tablet (40 mg) by mouth daily   Refills:  3        BENADRYL PO   Dose:  75 mg        Take 75 mg by mouth   Refills:  0        butalbital-acetaminophen-caffeine -40 MG per tablet   Commonly known as:  FIORICET/ESGIC   Dose:  1 tablet        Take 1 tablet by mouth as needed   Refills:  0        diazepam 5 MG tablet   Commonly known as:  VALIUM   Dose:  5 mg        Take 5 mg by mouth every 6 hours as needed   Refills:  0        escitalopram 10 MG tablet   Commonly known as:  LEXAPRO   Dose:  20 mg        Take 20 mg by mouth daily   Refills:  0        fentaNYL 25 mcg/hr 72 hr patch   Commonly known as:  DURAGESIC   Dose:  1 patch        Place 1 patch onto the skin every 72 hours   Refills:  0        FLEXERIL PO   Dose:  5 mg        Take 5 mg by mouth 3 times daily Pt reports she takes 3 tablets at bedtime   Refills:  0        fluticasone 110 MCG/ACT Inhaler   Commonly known as:  FLOVENT HFA   Dose:  2 puff        Inhale 2 puffs into the lungs 2 times daily   Refills:  0        * HYDROcodone-acetaminophen 5-325 MG per tablet   Commonly known as:  NORCO   Dose:  1 tablet        Take 1 tablet by mouth every 6 hours as needed for moderate to severe pain   Refills:  0        * HYDROcodone-acetaminophen 5-325 MG per tablet   Commonly known as:  NORCO   Dose:  1 tablet        Take 1 tablet by mouth as needed   Refills:  0        ipratropium - albuterol 0.5 mg/2.5 mg/3 mL 0.5-2.5 (3) MG/3ML neb solution   Commonly known as:  DUONEB   Dose:  1 vial   Quantity:  150 mL        Inhale 1 vial (3 mLs) into the lungs every 6 hours as needed for shortness of breath / dyspnea or wheezing   Refills:  0         NEXIUM PO   Dose:  40 mg        Take 40 mg by mouth 2 times daily   Refills:  0        nicotine polacrilex 2 MG gum   Commonly known as:  NICORETTE   Dose:  2 mg   Quantity:  20 each        Place 1 each (2 mg) inside cheek every hour as needed for smoking cessation   Refills:  0        NITROSTAT 0.4 MG sublingual tablet   Generic drug:  nitroGLYcerin        Reported on 3/22/2017   Refills:  0        topiramate 50 MG tablet   Commonly known as:  TOPAMAX        Take by mouth 2 times daily 2 every am and 3 every pm   Refills:  0        traZODone 50 MG tablet   Commonly known as:  DESYREL   Dose:  50 mg        Take 50 mg by mouth At Bedtime   Refills:  0        * Notice:  This list has 2 medication(s) that are the same as other medications prescribed for you. Read the directions carefully, and ask your doctor or other care provider to review them with you.            Procedures and tests performed during your visit     Peripheral IV: Standard      Orders Needing Specimen Collection     None      Pending Results     No orders found from 8/27/2017 to 8/30/2017.            Pending Culture Results     No orders found from 8/27/2017 to 8/30/2017.            Pending Results Instructions     If you had any lab results that were not finalized at the time of your Discharge, you can call the ED Lab Result RN at [phone]. You will be contacted by this team for any positive Lab results or changes in treatment. The nurses are available 7 days a week from 10A to 6:30P.  You can leave a message 24 hours per day and they will return your call.        Thank you for choosing [facility]       Thank you for choosing [facility] for your care. Our goal is always to provide you with excellent care. Hearing back from our patients is one way we can continue to improve our services. Please take a few minutes to complete the written survey that you may receive in the mail after you visit with us. Thank you!        MyChart Information     [facility]  "lets you send messages to your doctor, view your test results, renew your prescriptions, schedule appointments and more. To sign up, go to [email or web address] . Click on \"Log in\" on the left side of the screen, which will take you to the Welcome page. Then click on \"Sign up Now\" on the right side of the page.     You will be asked to enter the access code listed below, as well as some personal information. Please follow the directions to create your username and password.     Your access code is: [email or web address]  Expires: 2017  9:27 PM     Your access code will  in 90 days. If you need help or a new code, please call your [facility] clinic or [phone].        Care EveryWhere ID     This is your Care EveryWhere ID. This could be used by other organizations to access your [facility] medical records  [id number]        Equal Access to Services     [facility] Center[facility]IMA : Ana Aguilar, wasonia blue, qmaar machuca, marco a bryan . So [facility] [phone].    ATENCIÓN: Si habla español, tiene a ramos disposición servicios gratuitos de asistencia lingüística. Llame al [phone].    We comply with applicable federal civil rights laws and [place] laws. We do not discriminate on the basis of race, color, national origin, age, disability sex, sexual orientation or gender identity.            After Visit Summary       This is your record. Keep this with you and show to your community pharmacist(s) and doctor(s) at your next visit.                  "

## 2017-08-30 NOTE — ED PROVIDER NOTES
History     Chief Complaint   Patient presents with     Headache     HPI  Verena Velasquez is a 57 year old female who presents to the ED complaining of a headache.    Started Thursday, took floreset, typcially helps put she is way past injections  Pain clinic in [facility] for cranial injections, cant' get in until 5th    Gets migraines before the six weeks, injections significantly helped her  Lasts 4-5 weeks relief  Injections to shoulders, base of neck, base of skull and trigger points throughout the head    Pain is 25/10 currently  Sensitive to light and sound  No emesis or nausea    Pain in whole head, starts back of head by neck  Worse on left side    No fever, chills, and she is not feeling ill.  This is a typical migraine for her.  Spent today with a hot towel on head, but symptoms did not improve.    Migraine protocol has helped her in the past  No recent fall or head trauma.      I have reviewed the Medications, Allergies, Past Medical and Surgical History, and Social History in the Epic system.    Allergies:   Allergies   Allergen Reactions     Gabapentin      Lyrica [Pregabalin]      Morphine      Penicillins      Tramadol          No current facility-administered medications on file prior to encounter.   Current Outpatient Prescriptions on File Prior to Encounter:  aspirin-dipyridamole (AGGRENOX)  MG per 12 hr capsule Take 1 capsule by mouth 2 times daily   topiramate (TOPAMAX) 50 MG tablet Take by mouth 2 times daily 2 every am and 3 every pm   butalbital-acetaminophen-caffeine (FIORICET/ESGIC) -40 MG per tablet Take 1 tablet by mouth as needed   HYDROcodone-acetaminophen (NORCO) 5-325 MG per tablet Take 1 tablet by mouth as needed   atorvastatin (LIPITOR) 40 MG tablet Take 1 tablet (40 mg) by mouth daily   nicotine polacrilex (NICORETTE) 2 MG gum Place 1 each (2 mg) inside cheek every hour as needed for smoking cessation   diazepam (VALIUM) 5 MG tablet Take 5 mg by mouth every 6 hours as  "needed   escitalopram (LEXAPRO) 10 MG tablet Take 20 mg by mouth daily   nitroglycerin (NITROSTAT) 0.4 MG sublingual tablet Reported on 3/22/2017   traZODone (DESYREL) 50 MG tablet Take 50 mg by mouth At Bedtime    albuterol (2.5 MG/3ML) 0.083% nebulizer solution Take 1 vial (2.5 mg) by nebulization every 4 hours as needed for shortness of breath / dyspnea (wheezing/cough)   fentaNYL (DURAGESIC) 25 mcg/hr patch 72 hr Place 1 patch onto the skin every 72 hours   fluticasone (FLOVENT HFA) 110 MCG/ACT inhaler Inhale 2 puffs into the lungs 2 times daily   Esomeprazole Magnesium (NEXIUM PO) Take 40 mg by mouth 2 times daily    Cyclobenzaprine HCl (FLEXERIL PO) Take 5 mg by mouth 3 times daily Pt reports she takes 3 tablets at bedtime   DiphenhydrAMINE HCl (BENADRYL PO) Take 75 mg by mouth   HYDROcodone-acetaminophen (NORCO) 5-325 MG per tablet Take 1 tablet by mouth every 6 hours as needed for moderate to severe pain   ipratropium - albuterol 0.5 mg/2.5 mg/3 mL (DUONEB) 0.5-2.5 (3) MG/3ML nebulization Inhale 1 vial (3 mLs) into the lungs every 6 hours as needed for shortness of breath / dyspnea or wheezing       Patient Active Problem List   Diagnosis     Stroke (H)       Past Surgical History:   Procedure Laterality Date     GYN SURGERY       ORTHOPEDIC SURGERY         Social History   Substance Use Topics     Smoking status: Current Every Day Smoker     Packs/day: 1.00     Smokeless tobacco: Not on file     Alcohol use No       There is no immunization history for the selected administration types on file for this patient.    BMI: Estimated body mass index is 40.77 kg/(m^2) as calculated from the following:    Height as of this encounter: 1.651 m (5' 5\").    Weight as of this encounter: 111.1 kg (245 lb).      Review of Systems   All other systems reviewed and are negative.      Physical Exam   BP: 136/78  Heart Rate: 97  Temp: 98.1  F (36.7  C)  Height: 165.1 cm (5' 5\")  Weight: 111.1 kg (245 lb)  SpO2: 94 %  Physical " Exam  Generally healthy appearing female in NAD who is active and non-toxic appearing.   Sitting in a dark room with sunglasses on.   Head: Normocephalic, atraumatic, nontender to palpation  Eyes: PERRLA, conjunctiva and sclera clear  Ears: Bilateral TM's and canals are clear.  TM's translucent without erythema or effusion.  Nose: Nares normal and patent bilaterally.  Mucous membranes are non-erythematous and non-edematous.  No sinus tenderness.  Throat: Mucous membranes are clear.  No tonsilar hypertrophy, exudate, or erythema.  Neck: Supple.  FROM without pain.  No adenopathy.  No thyromegaly.   Neuro:  Cranial nerves II-XII grossly intact.  Romberg is steady.  Gait WNL's.  Cerebellar testing is WNL's.  Sensation is intact to light touch throughout.  Bicep, brachioradialis, patellar, and achilles DTR's 2/4 without clonus.  No neurological abnormality identified.     ED Course     ED Course     Procedures             Critical Care time:  none               Labs Ordered and Resulted from Time of ED Arrival Up to the Time of Departure from the ED - No data to display    Assessments & Plan (with Medical Decision Making)  Migraine without aura and without status migrainosus, not intractable     57 year old female with a history of chronic recurrent migraines managed by her pain clinic performing trigger point injections every 6 weeks who presents for evaluation of her breakthrough migraine. She states that she is overdue for her injection series, and she was not able to get an appointment until about 9 days from now. She states that this is a typical migraine that has been building up from her posterior neck and has come up throughout the bilateral head. Associated with photophobia and phonophobia. Some nausea but no vomiting. No recent head trauma. No fevers, chills, or URI symptoms. No extremity or facial weakness/numbness/tingling. On exam her vital signs are normal. She appears to be in pain. She is wearing  sunglasses and sitting in a dark room. Normal and nonfocal neurological exam noted. Migraine protocol initiated with IV normal saline, IV magnesium, IV Compazine, IV Benadryl, and IV Toradol. Her headache was rated 25 on a scale of 10 initially. 2 hours into her treatment, her headache was rated 3-4 on a scale of 10. She was sleeping near the end of her visit. She was happy with these results, and she feels that her headache little salt completely if she is able to go home and sleep. I did offer her bupivacaine trigger point injections, but she declined. She would rather come back to the ED for a repeat evaluation if her headache were to return. The patient was discharged in stable condition. A family member was present to drive her home.      I have reviewed the nursing notes.    I have reviewed the findings, diagnosis, plan and need for follow up with the patient.       New Prescriptions    No medications on file       Final diagnoses:   Migraine without aura and without status migrainosus, not intractable       Disclaimer: This note consists of symbols derived from keyboarding, dictation and/or voice recognition software. As a result, there may be errors in the script that have gone undetected. Please consider this when interpreting information found in this chart.     8/29/2017   Abner Vaughn PA-C   [facility] EMERGENCY DEPARTMENT     Abner Vaughn PA-C  08/29/17 0221

## 2017-08-30 NOTE — DISCHARGE INSTRUCTIONS
1.  Please try and get into your Pain Clinic as soon as possible to get your next series of trigger point injections.  2.  Please return to the ED prior to then if your headache returns.  We could always consider Bupivacaine trigger point injections done here.